# Patient Record
Sex: MALE | Race: WHITE | NOT HISPANIC OR LATINO | Employment: PART TIME | URBAN - METROPOLITAN AREA
[De-identification: names, ages, dates, MRNs, and addresses within clinical notes are randomized per-mention and may not be internally consistent; named-entity substitution may affect disease eponyms.]

---

## 2023-02-24 LAB — SARS-COV-2 RESULT: NOT DETECTED

## 2023-04-21 ENCOUNTER — OFFICE VISIT (OUTPATIENT)
Dept: PRIMARY CARE | Facility: CLINIC | Age: 68
End: 2023-04-21
Payer: MEDICARE

## 2023-04-21 ENCOUNTER — LAB (OUTPATIENT)
Dept: LAB | Facility: LAB | Age: 68
End: 2023-04-21
Payer: MEDICARE

## 2023-04-21 VITALS
DIASTOLIC BLOOD PRESSURE: 80 MMHG | RESPIRATION RATE: 19 BRPM | WEIGHT: 242 LBS | BODY MASS INDEX: 34.65 KG/M2 | OXYGEN SATURATION: 93 % | SYSTOLIC BLOOD PRESSURE: 128 MMHG | HEART RATE: 80 BPM | TEMPERATURE: 97.5 F | HEIGHT: 70 IN

## 2023-04-21 DIAGNOSIS — R10.9 ABDOMINAL PAIN, UNSPECIFIED ABDOMINAL LOCATION: ICD-10-CM

## 2023-04-21 DIAGNOSIS — S30.1XXA ABDOMINAL WALL SEROMA, INITIAL ENCOUNTER: Primary | ICD-10-CM

## 2023-04-21 DIAGNOSIS — R14.0 ABDOMINAL BLOATING: ICD-10-CM

## 2023-04-21 DIAGNOSIS — R10.9 ABDOMINAL PAIN, UNSPECIFIED ABDOMINAL LOCATION: Primary | ICD-10-CM

## 2023-04-21 PROBLEM — C34.90 LUNG CANCER (MULTI): Status: ACTIVE | Noted: 2023-04-21

## 2023-04-21 PROBLEM — R05.9 COUGH: Status: ACTIVE | Noted: 2023-04-21

## 2023-04-21 LAB
ALANINE AMINOTRANSFERASE (SGPT) (U/L) IN SER/PLAS: 30 U/L (ref 10–52)
ALBUMIN (G/DL) IN SER/PLAS: 4.4 G/DL (ref 3.4–5)
ALKALINE PHOSPHATASE (U/L) IN SER/PLAS: 62 U/L (ref 33–136)
AMYLASE (U/L) IN SER/PLAS: 39 U/L (ref 29–103)
ANION GAP IN SER/PLAS: 11 MMOL/L (ref 10–20)
ASPARTATE AMINOTRANSFERASE (SGOT) (U/L) IN SER/PLAS: 20 U/L (ref 9–39)
BASOPHILS (10*3/UL) IN BLOOD BY AUTOMATED COUNT: 0.03 X10E9/L (ref 0–0.1)
BASOPHILS/100 LEUKOCYTES IN BLOOD BY AUTOMATED COUNT: 0.2 % (ref 0–2)
BILIRUBIN TOTAL (MG/DL) IN SER/PLAS: 0.4 MG/DL (ref 0–1.2)
CALCIUM (MG/DL) IN SER/PLAS: 8.9 MG/DL (ref 8.6–10.3)
CARBON DIOXIDE, TOTAL (MMOL/L) IN SER/PLAS: 29 MMOL/L (ref 21–32)
CHLORIDE (MMOL/L) IN SER/PLAS: 105 MMOL/L (ref 98–107)
CREATININE (MG/DL) IN SER/PLAS: 1.14 MG/DL (ref 0.5–1.3)
EOSINOPHILS (10*3/UL) IN BLOOD BY AUTOMATED COUNT: 0 X10E9/L (ref 0–0.7)
EOSINOPHILS/100 LEUKOCYTES IN BLOOD BY AUTOMATED COUNT: 0 % (ref 0–6)
ERYTHROCYTE DISTRIBUTION WIDTH (RATIO) BY AUTOMATED COUNT: 14.4 % (ref 11.5–14.5)
ERYTHROCYTE MEAN CORPUSCULAR HEMOGLOBIN CONCENTRATION (G/DL) BY AUTOMATED: 31.5 G/DL (ref 32–36)
ERYTHROCYTE MEAN CORPUSCULAR VOLUME (FL) BY AUTOMATED COUNT: 96 FL (ref 80–100)
ERYTHROCYTES (10*6/UL) IN BLOOD BY AUTOMATED COUNT: 4.51 X10E12/L (ref 4.5–5.9)
GFR MALE: 70 ML/MIN/1.73M2
GLUCOSE (MG/DL) IN SER/PLAS: 99 MG/DL (ref 74–99)
HEMATOCRIT (%) IN BLOOD BY AUTOMATED COUNT: 43.2 % (ref 41–52)
HEMOGLOBIN (G/DL) IN BLOOD: 13.6 G/DL (ref 13.5–17.5)
IMMATURE GRANULOCYTES/100 LEUKOCYTES IN BLOOD BY AUTOMATED COUNT: 1 % (ref 0–0.9)
LEUKOCYTES (10*3/UL) IN BLOOD BY AUTOMATED COUNT: 12.2 X10E9/L (ref 4.4–11.3)
LIPASE (U/L) IN SER/PLAS: 7 U/L (ref 9–82)
LYMPHOCYTES (10*3/UL) IN BLOOD BY AUTOMATED COUNT: 1.03 X10E9/L (ref 1.2–4.8)
LYMPHOCYTES/100 LEUKOCYTES IN BLOOD BY AUTOMATED COUNT: 8.4 % (ref 13–44)
MONOCYTES (10*3/UL) IN BLOOD BY AUTOMATED COUNT: 1.03 X10E9/L (ref 0.1–1)
MONOCYTES/100 LEUKOCYTES IN BLOOD BY AUTOMATED COUNT: 8.4 % (ref 2–10)
NEUTROPHILS (10*3/UL) IN BLOOD BY AUTOMATED COUNT: 10 X10E9/L (ref 1.2–7.7)
NEUTROPHILS/100 LEUKOCYTES IN BLOOD BY AUTOMATED COUNT: 82 % (ref 40–80)
NRBC (PER 100 WBCS) BY AUTOMATED COUNT: 0 /100 WBC (ref 0–0)
PLATELETS (10*3/UL) IN BLOOD AUTOMATED COUNT: 207 X10E9/L (ref 150–450)
POTASSIUM (MMOL/L) IN SER/PLAS: 4.2 MMOL/L (ref 3.5–5.3)
PROTEIN TOTAL: 7 G/DL (ref 6.4–8.2)
SEDIMENTATION RATE, ERYTHROCYTE: 9 MM/H (ref 0–20)
SODIUM (MMOL/L) IN SER/PLAS: 141 MMOL/L (ref 136–145)
UREA NITROGEN (MG/DL) IN SER/PLAS: 27 MG/DL (ref 6–23)

## 2023-04-21 PROCEDURE — 99213 OFFICE O/P EST LOW 20 MIN: CPT | Performed by: FAMILY MEDICINE

## 2023-04-21 PROCEDURE — 83690 ASSAY OF LIPASE: CPT

## 2023-04-21 PROCEDURE — 36415 COLL VENOUS BLD VENIPUNCTURE: CPT

## 2023-04-21 PROCEDURE — 82150 ASSAY OF AMYLASE: CPT

## 2023-04-21 PROCEDURE — 80053 COMPREHEN METABOLIC PANEL: CPT

## 2023-04-21 PROCEDURE — 85652 RBC SED RATE AUTOMATED: CPT

## 2023-04-21 PROCEDURE — 85025 COMPLETE CBC W/AUTO DIFF WBC: CPT

## 2023-04-21 RX ORDER — IPRATROPIUM BROMIDE 0.5 MG/2.5ML
SOLUTION RESPIRATORY (INHALATION)
COMMUNITY
End: 2024-05-10 | Stop reason: ENTERED-IN-ERROR

## 2023-04-21 RX ORDER — IBUPROFEN 600 MG/1
TABLET ORAL EVERY 6 HOURS PRN
COMMUNITY
End: 2024-05-10 | Stop reason: ENTERED-IN-ERROR

## 2023-04-21 RX ORDER — ALBUTEROL SULFATE 90 UG/1
2 AEROSOL, METERED RESPIRATORY (INHALATION) EVERY 4 HOURS PRN
COMMUNITY
Start: 2022-11-28

## 2023-04-21 RX ORDER — PREDNISONE 20 MG/1
TABLET ORAL
COMMUNITY
Start: 2022-11-28 | End: 2024-05-10 | Stop reason: ENTERED-IN-ERROR

## 2023-04-21 RX ORDER — IPRATROPIUM BROMIDE AND ALBUTEROL SULFATE 2.5; .5 MG/3ML; MG/3ML
3 SOLUTION RESPIRATORY (INHALATION) EVERY 4 HOURS PRN
COMMUNITY
Start: 2023-04-12

## 2023-04-21 RX ORDER — FLUTICASONE FUROATE, UMECLIDINIUM BROMIDE AND VILANTEROL TRIFENATATE 200; 62.5; 25 UG/1; UG/1; UG/1
1 POWDER RESPIRATORY (INHALATION) DAILY
COMMUNITY
Start: 2023-02-21

## 2023-04-21 RX ORDER — OXYCODONE HYDROCHLORIDE 5 MG/1
TABLET ORAL
COMMUNITY
Start: 2023-02-28 | End: 2024-05-10 | Stop reason: ENTERED-IN-ERROR

## 2023-04-21 RX ORDER — UMECLIDINIUM BROMIDE AND VILANTEROL TRIFENATATE 62.5; 25 UG/1; UG/1
POWDER RESPIRATORY (INHALATION)
COMMUNITY
End: 2024-05-10 | Stop reason: ENTERED-IN-ERROR

## 2023-04-21 RX ORDER — ALBUTEROL SULFATE 0.83 MG/ML
2.5 SOLUTION RESPIRATORY (INHALATION)
COMMUNITY
Start: 2023-04-12 | End: 2024-05-10 | Stop reason: ENTERED-IN-ERROR

## 2023-04-21 RX ORDER — CHLORHEXIDINE GLUCONATE 4 G/100ML
SOLUTION TOPICAL
COMMUNITY
Start: 2023-02-20 | End: 2024-05-10 | Stop reason: ENTERED-IN-ERROR

## 2023-04-21 RX ORDER — CHLORHEXIDINE GLUCONATE ORAL RINSE 1.2 MG/ML
SOLUTION DENTAL
COMMUNITY
Start: 2023-02-20 | End: 2024-05-10 | Stop reason: ENTERED-IN-ERROR

## 2023-04-21 RX ORDER — FUROSEMIDE 20 MG/1
1 TABLET ORAL 2 TIMES DAILY
COMMUNITY
Start: 2022-05-18 | End: 2024-05-10 | Stop reason: ENTERED-IN-ERROR

## 2023-04-21 RX ORDER — AMOXICILLIN AND CLAVULANATE POTASSIUM 875; 125 MG/1; MG/1
875 TABLET, FILM COATED ORAL 2 TIMES DAILY
Qty: 28 TABLET | Refills: 0 | Status: SHIPPED | OUTPATIENT
Start: 2023-04-21 | End: 2023-05-05

## 2023-04-21 ASSESSMENT — ENCOUNTER SYMPTOMS
BLOOD IN STOOL: 0
FEVER: 0
SHORTNESS OF BREATH: 0
NAUSEA: 0
CONSTIPATION: 0
VOMITING: 0
ABDOMINAL PAIN: 1

## 2023-04-21 NOTE — PROGRESS NOTES
Subjective   Patient ID: Norma Erwin is a 67 y.o. male who presents for Follow-up (2 month follow up from lung surgery. Pt states he has been doing ok. He did see pulmonologist and had some meds changed due to SOB. SOB has improved. He also states discomfort and bloating in the right side and tenderness to the touch. ).  HPI    Review of Systems   Constitutional:  Negative for fever.   Respiratory:  Negative for shortness of breath.    Cardiovascular:  Negative for chest pain.   Gastrointestinal:  Positive for abdominal pain. Negative for blood in stool, constipation, nausea and vomiting.       Objective   Physical Exam  Constitutional:       Appearance: Normal appearance.   HENT:      Head: Normocephalic.      Right Ear: Tympanic membrane, ear canal and external ear normal.      Left Ear: Tympanic membrane, ear canal and external ear normal.      Nose: Nose normal.      Mouth/Throat:      Mouth: Mucous membranes are moist.      Pharynx: Oropharynx is clear.   Eyes:      Conjunctiva/sclera: Conjunctivae normal.   Cardiovascular:      Rate and Rhythm: Normal rate and regular rhythm.   Pulmonary:      Effort: Pulmonary effort is normal.      Breath sounds: Normal breath sounds.   Abdominal:      General: There is distension (right flank).      Tenderness: There is abdominal tenderness. There is rebound.   Musculoskeletal:         General: Normal range of motion.      Cervical back: Neck supple.   Skin:     General: Skin is warm and dry.   Neurological:      General: No focal deficit present.      Mental Status: He is alert and oriented to person, place, and time.   Psychiatric:         Mood and Affect: Mood normal.         Assessment/Plan

## 2023-06-14 ENCOUNTER — OFFICE VISIT (OUTPATIENT)
Dept: PRIMARY CARE | Facility: CLINIC | Age: 68
End: 2023-06-14
Payer: MEDICARE

## 2023-06-14 VITALS
RESPIRATION RATE: 18 BRPM | HEART RATE: 68 BPM | BODY MASS INDEX: 33.32 KG/M2 | SYSTOLIC BLOOD PRESSURE: 130 MMHG | WEIGHT: 238 LBS | OXYGEN SATURATION: 96 % | DIASTOLIC BLOOD PRESSURE: 66 MMHG | TEMPERATURE: 97.7 F | HEIGHT: 71 IN

## 2023-06-14 DIAGNOSIS — R10.11 RIGHT UPPER QUADRANT ABDOMINAL PAIN: Primary | ICD-10-CM

## 2023-06-14 PROCEDURE — 99213 OFFICE O/P EST LOW 20 MIN: CPT | Performed by: FAMILY MEDICINE

## 2023-06-14 RX ORDER — HYOSCYAMINE SULFATE 0.12 MG/1
0.12 TABLET, ORALLY DISINTEGRATING ORAL 2 TIMES DAILY
Qty: 14 TABLET | Refills: 0 | Status: SHIPPED | OUTPATIENT
Start: 2023-06-14 | End: 2024-03-01 | Stop reason: WASHOUT

## 2023-06-14 RX ORDER — PREDNISONE 20 MG/1
40 TABLET ORAL DAILY
Qty: 10 TABLET | Refills: 0 | Status: SHIPPED | OUTPATIENT
Start: 2023-06-14 | End: 2023-06-14 | Stop reason: SDUPTHER

## 2023-06-14 RX ORDER — HYOSCYAMINE SULFATE 0.12 MG/1
0.12 TABLET, ORALLY DISINTEGRATING ORAL 2 TIMES DAILY
Qty: 14 TABLET | Refills: 0 | Status: SHIPPED | OUTPATIENT
Start: 2023-06-14 | End: 2023-06-14 | Stop reason: SDUPTHER

## 2023-06-14 RX ORDER — HYOSCYAMINE SULFATE 0.12 MG/1
125 TABLET SUBLINGUAL 2 TIMES DAILY
Qty: 14 TABLET | Refills: 0 | Status: SHIPPED | OUTPATIENT
Start: 2023-06-14 | End: 2023-06-14 | Stop reason: SDUPTHER

## 2023-06-14 RX ORDER — PREDNISONE 20 MG/1
40 TABLET ORAL DAILY
Qty: 10 TABLET | Refills: 0 | Status: SHIPPED | OUTPATIENT
Start: 2023-06-14 | End: 2023-06-19

## 2023-06-14 ASSESSMENT — ENCOUNTER SYMPTOMS
CONSTIPATION: 0
FEVER: 0
DIARRHEA: 0
VOMITING: 0
SHORTNESS OF BREATH: 0
ABDOMINAL DISTENTION: 1
FLANK PAIN: 1
ABDOMINAL PAIN: 1
NAUSEA: 0

## 2023-06-14 NOTE — PROGRESS NOTES
Subjective   Patient ID: Norma Erwin is a 67 y.o. male who presents for Follow-up (Pt is her for follow up from surgery for a nodule that was removed 2-23 from the right lung. He states he had a drain on the right side that was removed and since he has been having a pressure feeling and pain and discomfort. ).  HPI    Review of Systems   Constitutional:  Negative for fever.   Respiratory:  Negative for shortness of breath.    Cardiovascular:  Negative for chest pain.   Gastrointestinal:  Positive for abdominal distention and abdominal pain. Negative for constipation, diarrhea, nausea and vomiting.   Genitourinary:  Positive for flank pain.       Objective   Physical Exam  Constitutional:       Appearance: Normal appearance.   HENT:      Head: Normocephalic.      Right Ear: Tympanic membrane, ear canal and external ear normal.      Left Ear: Tympanic membrane, ear canal and external ear normal.      Nose: Nose normal.      Mouth/Throat:      Mouth: Mucous membranes are moist.      Pharynx: Oropharynx is clear.   Eyes:      Conjunctiva/sclera: Conjunctivae normal.   Cardiovascular:      Rate and Rhythm: Normal rate and regular rhythm.   Pulmonary:      Effort: Pulmonary effort is normal.      Breath sounds: Normal breath sounds.   Abdominal:      General: Abdomen is flat.      Palpations: Abdomen is soft.      Tenderness: There is abdominal tenderness in the right upper quadrant.       Musculoskeletal:         General: Normal range of motion.      Cervical back: Neck supple.   Skin:     General: Skin is warm and dry.   Neurological:      General: No focal deficit present.      Mental Status: He is alert and oriented to person, place, and time.   Psychiatric:         Mood and Affect: Mood normal.         Assessment/Plan   Problem List Items Addressed This Visit    None  Visit Diagnoses       Right upper quadrant abdominal pain    -  Primary    Relevant Medications    hyoscyamine (Levsin/SL) 0.125 mg SL tablet     predniSONE (Deltasone) 20 mg tablet

## 2023-06-28 ENCOUNTER — TELEPHONE (OUTPATIENT)
Dept: PRIMARY CARE | Facility: CLINIC | Age: 68
End: 2023-06-28
Payer: MEDICARE

## 2023-06-28 NOTE — TELEPHONE ENCOUNTER
Snow Graves a Pulmonary NP called and would like to know if you would be able to call her to review Lad's case?     [phone]

## 2023-08-25 LAB
ALANINE AMINOTRANSFERASE (SGPT) (U/L) IN SER/PLAS: 26 U/L (ref 10–52)
ALBUMIN (G/DL) IN SER/PLAS: 4.3 G/DL (ref 3.4–5)
ALKALINE PHOSPHATASE (U/L) IN SER/PLAS: 66 U/L (ref 33–136)
ANION GAP IN SER/PLAS: 12 MMOL/L (ref 10–20)
ASPARTATE AMINOTRANSFERASE (SGOT) (U/L) IN SER/PLAS: 18 U/L (ref 9–39)
BILIRUBIN TOTAL (MG/DL) IN SER/PLAS: 0.3 MG/DL (ref 0–1.2)
CALCIUM (MG/DL) IN SER/PLAS: 9.5 MG/DL (ref 8.6–10.6)
CARBON DIOXIDE, TOTAL (MMOL/L) IN SER/PLAS: 30 MMOL/L (ref 21–32)
CHLORIDE (MMOL/L) IN SER/PLAS: 105 MMOL/L (ref 98–107)
CREATININE (MG/DL) IN SER/PLAS: 1.15 MG/DL (ref 0.5–1.3)
GFR MALE: 69 ML/MIN/1.73M2
GLUCOSE (MG/DL) IN SER/PLAS: 73 MG/DL (ref 74–99)
POTASSIUM (MMOL/L) IN SER/PLAS: 4.3 MMOL/L (ref 3.5–5.3)
PROTEIN TOTAL: 6.8 G/DL (ref 6.4–8.2)
SODIUM (MMOL/L) IN SER/PLAS: 143 MMOL/L (ref 136–145)
UREA NITROGEN (MG/DL) IN SER/PLAS: 15 MG/DL (ref 6–23)

## 2024-02-06 DIAGNOSIS — C34.90 MALIGNANT NEOPLASM OF LUNG, UNSPECIFIED LATERALITY, UNSPECIFIED PART OF LUNG (MULTI): Primary | ICD-10-CM

## 2024-02-23 ENCOUNTER — LAB (OUTPATIENT)
Dept: LAB | Facility: LAB | Age: 69
End: 2024-02-23
Payer: MEDICARE

## 2024-02-23 DIAGNOSIS — C34.90 MALIGNANT NEOPLASM OF LUNG, UNSPECIFIED LATERALITY, UNSPECIFIED PART OF LUNG (MULTI): ICD-10-CM

## 2024-02-23 LAB
BUN SERPL-MCNC: 19 MG/DL (ref 6–23)
CREAT SERPL-MCNC: 1.17 MG/DL (ref 0.5–1.3)
EGFRCR SERPLBLD CKD-EPI 2021: 67 ML/MIN/1.73M*2

## 2024-02-23 PROCEDURE — 82565 ASSAY OF CREATININE: CPT

## 2024-02-23 PROCEDURE — 84520 ASSAY OF UREA NITROGEN: CPT

## 2024-02-27 ENCOUNTER — HOSPITAL ENCOUNTER (OUTPATIENT)
Dept: RADIOLOGY | Facility: CLINIC | Age: 69
Discharge: HOME | End: 2024-02-27
Payer: MEDICARE

## 2024-02-27 DIAGNOSIS — C34.91 MALIGNANT NEOPLASM OF UNSPECIFIED PART OF RIGHT BRONCHUS OR LUNG (MULTI): ICD-10-CM

## 2024-02-27 PROCEDURE — 71260 CT THORAX DX C+: CPT

## 2024-02-27 PROCEDURE — 2550000001 HC RX 255 CONTRASTS: Performed by: INTERNAL MEDICINE

## 2024-02-27 RX ADMIN — IOHEXOL 69 ML: 350 INJECTION, SOLUTION INTRAVENOUS at 10:34

## 2024-02-28 NOTE — PROGRESS NOTES
Subjective   Matt Erwin  is a 68 y.o. male who presents for evaluation of right upper lobe lung cancer status postsurgical resection 2/27/2023.    This patient presented with a an abnormal x-ray in November 2022. He was referred for a CT scan which confirmed a right upper lobe lung lesion. He was referred for a CT-guided biopsy which was performed in January 2023 and showed poorly differentiated carcinoma. He was referred for a PET/CT which showed a right upper lobe lesion with no evidence of metastatic disease. I recommended surgical resection. The patient was taken to the operating room on 2/27/2023 and underwent a stable right upper lobe segmental resection. He did well and was discharged home.     Currently the patient is presenting for routine follow-up, in their usual state of health, and reporting symptoms of chronic dyspnea on exertion. He deny the following symptoms: chest pain, shortness of breath at rest, cough, hemoptysis, fevers, chills, and weight loss.  Gaining weight    There have been no significant changes to their documented medical, surgical and family history.     He  reports that he has quit smoking. His smoking use included cigarettes. He has never used smokeless tobacco.    Objective   Physical Exam  Phone visit  Diagnostic Studies  I have reviewed a CT which shows multiple new lung nodules    Assessment/Plan   Overall, I believe that the patient has concerning findings and requires additional workup.     Etiology of these lung nodules are unclear.  The bilateral and lower lobe distribution to me suggest an infectious or inflammatory process, but I cannot exclude the possibility of metastasis or recurrence.  In this setting, I believe a PET/CT is  reasonable.  I described this to the patient in detail.  He agreed.    The patient will also is seeing Dr. Moran, whose opinion on this matter I also value    I recommend PET CT    I discussed this in detail with the patient, including a discussion  of alternatives. They were comfortable with this approach.     Time 5min    Michael Auguste MD  [phone]

## 2024-02-29 ENCOUNTER — TELEMEDICINE (OUTPATIENT)
Dept: SURGERY | Facility: HOSPITAL | Age: 69
End: 2024-02-29
Payer: MEDICARE

## 2024-02-29 DIAGNOSIS — C34.11 MALIGNANT NEOPLASM OF UPPER LOBE OF RIGHT LUNG (MULTI): Primary | ICD-10-CM

## 2024-02-29 PROCEDURE — 99441 PR PHYS/QHP TELEPHONE EVALUATION 5-10 MIN: CPT | Performed by: THORACIC SURGERY (CARDIOTHORACIC VASCULAR SURGERY)

## 2024-02-29 PROCEDURE — 1125F AMNT PAIN NOTED PAIN PRSNT: CPT | Performed by: THORACIC SURGERY (CARDIOTHORACIC VASCULAR SURGERY)

## 2024-02-29 PROCEDURE — 1036F TOBACCO NON-USER: CPT | Performed by: THORACIC SURGERY (CARDIOTHORACIC VASCULAR SURGERY)

## 2024-03-01 ENCOUNTER — OFFICE VISIT (OUTPATIENT)
Dept: HEMATOLOGY/ONCOLOGY | Facility: CLINIC | Age: 69
End: 2024-03-01
Payer: MEDICARE

## 2024-03-01 VITALS
BODY MASS INDEX: 32.87 KG/M2 | RESPIRATION RATE: 16 BRPM | SYSTOLIC BLOOD PRESSURE: 149 MMHG | OXYGEN SATURATION: 94 % | DIASTOLIC BLOOD PRESSURE: 75 MMHG | TEMPERATURE: 97.9 F | WEIGHT: 235.67 LBS | HEART RATE: 86 BPM

## 2024-03-01 DIAGNOSIS — C34.01 MALIGNANT NEOPLASM OF HILUS OF RIGHT LUNG (MULTI): Primary | ICD-10-CM

## 2024-03-01 DIAGNOSIS — C34.31 MALIGNANT NEOPLASM OF LOWER LOBE OF RIGHT LUNG (MULTI): ICD-10-CM

## 2024-03-01 PROCEDURE — 1126F AMNT PAIN NOTED NONE PRSNT: CPT | Performed by: INTERNAL MEDICINE

## 2024-03-01 PROCEDURE — 1159F MED LIST DOCD IN RCRD: CPT | Performed by: INTERNAL MEDICINE

## 2024-03-01 PROCEDURE — 1036F TOBACCO NON-USER: CPT | Performed by: INTERNAL MEDICINE

## 2024-03-01 PROCEDURE — 99213 OFFICE O/P EST LOW 20 MIN: CPT | Performed by: INTERNAL MEDICINE

## 2024-03-01 RX ORDER — IBUPROFEN 200 MG
2-4 TABLET ORAL EVERY 6 HOURS PRN
COMMUNITY

## 2024-03-01 ASSESSMENT — ENCOUNTER SYMPTOMS
VOMITING: 0
CONSTIPATION: 0
HEADACHES: 0
DIZZINESS: 0
EYE PROBLEMS: 0
COUGH: 0
DIARRHEA: 0
SHORTNESS OF BREATH: 0
ADENOPATHY: 0
ARTHRALGIAS: 0
DIFFICULTY URINATING: 0
FATIGUE: 0
ABDOMINAL PAIN: 0
NAUSEA: 0
LEG SWELLING: 0
APPETITE CHANGE: 0
BACK PAIN: 0
UNEXPECTED WEIGHT CHANGE: 0

## 2024-03-01 ASSESSMENT — PAIN SCALES - GENERAL: PAINLEVEL: 0-NO PAIN

## 2024-03-01 NOTE — PROGRESS NOTES
"[facility] - Medical Oncology Follow-Up Visit    Patient ID: Norma Erwin \"Matt\" is a 68 y.o. male with prior poorly differentiated SCC of the lung     Current therapy:Surveillance     Chief Concern: Here for follow-up and scan review    HPI Patient here today for follow-up. Overall he has been feeling pretty well - over the winter had a couple episodes of worsening cough/shortness of breath and was treated by Dr. Escobedo with steroids/abx. He reports having CXR and CT scan (though I'm unable to find). Some fatigue but good days and bad. Weight overall stable, has fluctuated both directions.    Diagnosis: poorly differentiated SCC of the lung  Stage:  Cancer Staging   Lung cancer (CMS/HCC)  Staging form: Lung, AJCC 8th Edition  - Clinical stage from 2/27/2023: Stage IA3 (cT1c, cN0, cM0) - Signed by Nia Moran MD on 3/1/2024     Current sites of disease: ?  Molecular and ancillary testing:   +PIK3CA and TP53 mutations  PD-L1 90%    Oncologic History:  11/2022 - CXR with RUL lesion, confirmed on CT 12/2022 1/2023 - CT-guided biopsy of RUL lesion with poorly differentiated carcinoma; PET with RUL lesion, no evidence of metastatic disease  2/27/23 - RUL segmentectomy w/ christofer Matute with pT1cN0 squamous cell carcinoma, poorly differentiated, negative margins  2/27/24 - CT chest with development of multiple bilateral lower lobe nodules    Meds (Current):  Current Outpatient Medications   Medication Instructions    albuterol 2.5 mg    Anoro Ellipta 62.5-25 mcg/actuation blister with device inhalation    chlorhexidine (Peridex) 0.12 % solution RINSE MOUTH WITH 15 ML (1 CAPFUL) FOR 30 SECONDS IN THE MORNING AND IN THE EVENING AFTER TOOHBRUSHING, EXPECTORATE AFTER RINSING, DO NOT SWALLOW    Edna-Hex 4 % external liquid use as directed    fluticasone-umeclidin-vilanter (TRELEGY-ELLIPTA) 100-62.5-25 mcg blister with device inhalation    furosemide (Lasix) 20 mg tablet 1 tablet, oral, " 2 times daily    hyoscyamine (ANASPAZ) 0.125 mg, sublingual, 2 times daily    ibuprofen 600 mg tablet oral, Every 6 hours PRN    ibuprofen 800 mg, oral, Every 6 hours PRN    ipratropium (Atrovent) 0.02 % nebulizer solution INHALE 1 VIAL IN NEBULIZER EVERY 4 HOURS AS NEEDED    ipratropium-albuteroL (Duo-Neb) 0.5-2.5 mg/3 mL nebulizer solution 3 mL, inhalation, Every 4 hours PRN    multivitamin with minerals iron-free (Centrum Silver) 1 tablet, oral, Daily    oxyCODONE (Roxicodone) 5 mg immediate release tablet TAKE 1 TABLET BY MOUTH EVERY 6 HOURS AS NEEDED FOR PAIN AS DIRECTED BY PHYSICIAN -- MODERATE (4-6)    predniSONE (Deltasone) 20 mg tablet oral    Trelegy Ellipta 200-62.5-25 mcg blister with device INHALE 1 DOSE INTO LUNGS AS INSTRUCTED BY PHYSICIAN ONCE DAILY    Ventolin HFA 90 mcg/actuation inhaler 2 puffs, inhalation, Every 4 hours PRN      Review of Systems   Constitutional:  Negative for appetite change, fatigue and unexpected weight change.   HENT:   Negative for hearing loss.    Eyes:  Negative for eye problems.   Respiratory:  Negative for cough and shortness of breath.    Cardiovascular:  Negative for chest pain and leg swelling.   Gastrointestinal:  Negative for abdominal pain, constipation, diarrhea, nausea and vomiting.   Genitourinary:  Negative for difficulty urinating.    Musculoskeletal:  Negative for arthralgias and back pain.   Skin:  Negative for rash.   Neurological:  Negative for dizziness and headaches.   Hematological:  Negative for adenopathy.        Objective   BSA: 2.32 meters squared  /75 (BP Location: Left arm, Patient Position: Sitting)   Pulse 86   Temp 36.6 °C (97.9 °F) (Temporal)   Resp 16   Wt 107 kg (235 lb 10.8 oz)   SpO2 94%   BMI 32.87 kg/m²   Performance Status:  Symptomatic; fully ambulatory     Physical Exam  Vitals and nursing note reviewed.   Constitutional:       General: He is not in acute distress.  HENT:      Head: Normocephalic and atraumatic.   Eyes:       Pupils: Pupils are equal, round, and reactive to light.   Cardiovascular:      Rate and Rhythm: Normal rate and regular rhythm.      Heart sounds: Normal heart sounds.   Pulmonary:      Effort: Pulmonary effort is normal.      Breath sounds: Normal breath sounds.   Abdominal:      General: There is no distension.   Musculoskeletal:         General: No swelling.   Skin:     General: Skin is warm and dry.      Findings: No rash.   Neurological:      General: No focal deficit present.      Mental Status: He is alert and oriented to person, place, and time.   Psychiatric:         Mood and Affect: Mood normal.         Behavior: Behavior normal.        Results:  Labs:  Lab Results   Component Value Date    WBC 12.2 (H) 04/21/2023    HGB 13.6 04/21/2023    HCT 43.2 04/21/2023    MCV 96 04/21/2023     04/21/2023      Lab Results   Component Value Date    NEUTROABS 10.00 (H) 04/21/2023      Lab Results   Component Value Date    GLUCOSE 73 (L) 08/25/2023    CALCIUM 9.5 08/25/2023     08/25/2023    K 4.3 08/25/2023    CO2 30 08/25/2023     08/25/2023    BUN 19 02/23/2024    CREATININE 1.17 02/23/2024    MG CANCELED 03/01/2023     Lab Results   Component Value Date    ALT 26 08/25/2023    AST 18 08/25/2023    ALKPHOS 66 08/25/2023    BILITOT 0.3 08/25/2023      Lab Results   Component Value Date    TSH 3.05 11/28/2022       Imaging:  I have personally reviewed the below imaging and concur with the reported findings unless otherwise stated:    CT chest w IV contrast    Result Date: 2/28/2024  Impression: 1.  Interval development of bilateral lower lobe multiple solid nodules measuring up to 2.3 cm. Metastatic disease is definitely a concern and PET-CT or follow-up study in 3-6 months are considerations in differentiating between inflammatory and metastatic lesions. 2. Small right pleural effusion is slightly smaller than the prior exam   MACRO: None   Signed by: Susanna Berger 2/28/2024 10:11 AM  "Dictation workstation:   [id number]    Pathology:    No new pathology      Assessment/Plan      Norma Erwin \"Matt\" is a 68 y.o. 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     Currently under surveillance with repeat CT chest showing multiple new bilateral lower lobe solid nodules. Reviewed imaging with patient - discussed that time course and pattern, as well asrecent history of cough/infection treated with abx indicates these may be infectious/inflammatory nodules, however I agree with plan for PET to further assess. If not improved and significantly FDG-avid on PET will plan for biopsy.    Plan:    - PET/CT scheduled  - Follow-up with me after PET to review results and discuss next steps; plan phone visit per his request and can make in person as needed    Nia Moran MD  [facility]    "

## 2024-03-13 ENCOUNTER — HOSPITAL ENCOUNTER (OUTPATIENT)
Dept: RADIOLOGY | Facility: CLINIC | Age: 69
Discharge: HOME | End: 2024-03-13
Payer: MEDICARE

## 2024-03-13 DIAGNOSIS — C34.11 MALIGNANT NEOPLASM OF UPPER LOBE OF RIGHT LUNG (MULTI): ICD-10-CM

## 2024-03-13 PROCEDURE — A9552 F18 FDG: HCPCS | Mod: MUE | Performed by: THORACIC SURGERY (CARDIOTHORACIC VASCULAR SURGERY)

## 2024-03-13 PROCEDURE — 3430000001 HC RX 343 DIAGNOSTIC RADIOPHARMACEUTICALS: Mod: MUE | Performed by: THORACIC SURGERY (CARDIOTHORACIC VASCULAR SURGERY)

## 2024-03-13 PROCEDURE — 78815 PET IMAGE W/CT SKULL-THIGH: CPT | Mod: PS

## 2024-03-13 PROCEDURE — 78815 PET IMAGE W/CT SKULL-THIGH: CPT | Mod: PET TUMOR SUBSQ TX STRATEGY | Performed by: NUCLEAR MEDICINE

## 2024-03-13 RX ORDER — FLUDEOXYGLUCOSE F 18 200 MCI/ML
13.47 INJECTION, SOLUTION INTRAVENOUS
Status: COMPLETED | OUTPATIENT
Start: 2024-03-13 | End: 2024-03-13

## 2024-03-13 RX ADMIN — FLUDEOXYGLUCOSE F 18 13.47 MILLICURIE: 200 INJECTION, SOLUTION INTRAVENOUS at 08:49

## 2024-03-14 ENCOUNTER — TELEMEDICINE (OUTPATIENT)
Dept: HEMATOLOGY/ONCOLOGY | Facility: CLINIC | Age: 69
End: 2024-03-14
Payer: MEDICARE

## 2024-03-14 DIAGNOSIS — C34.31 MALIGNANT NEOPLASM OF LOWER LOBE OF RIGHT LUNG (MULTI): ICD-10-CM

## 2024-03-14 PROCEDURE — 1036F TOBACCO NON-USER: CPT | Performed by: INTERNAL MEDICINE

## 2024-03-14 PROCEDURE — 1159F MED LIST DOCD IN RCRD: CPT | Performed by: INTERNAL MEDICINE

## 2024-03-14 PROCEDURE — 99441 PR PHYS/QHP TELEPHONE EVALUATION 5-10 MIN: CPT | Performed by: INTERNAL MEDICINE

## 2024-03-14 NOTE — PROGRESS NOTES
Telephone visit with patient today to review Pet findings    Discussed no FDG avidity of multifocal nodules seen on CT, slight improvement. He reports he saw Dr. Escobedo yesterday and has plans to give sputum sample and possibly treat with abx pending results. He continues to feel well. I recommend repeat CT chest in about 2 months to ensure these are resolving and advised him to let us know if anything changes prior - including shortness of breath, worsening cough, weight loss etc.    Also asked about GERD/acid reflux - he reports may have mild symptoms. Reports discussed with Dr. Escobedo and has some plans with respect to this but will be discussed after sputum sample. He denies food getting stuck or dysphagia.    Plan follow-up in 2m after repeat CT    Nia Moran MD  [facility]

## 2024-05-03 ENCOUNTER — LAB (OUTPATIENT)
Dept: LAB | Facility: LAB | Age: 69
End: 2024-05-03
Payer: MEDICARE

## 2024-05-03 DIAGNOSIS — J44.9 CHRONIC OBSTRUCTIVE PULMONARY DISEASE, UNSPECIFIED (MULTI): Primary | ICD-10-CM

## 2024-05-03 LAB
BACTERIA SPEC RESP CULT: ABNORMAL
GRAM STN SPEC: ABNORMAL

## 2024-05-03 PROCEDURE — 87205 SMEAR GRAM STAIN: CPT

## 2024-05-03 PROCEDURE — 87015 SPECIMEN INFECT AGNT CONCNTJ: CPT

## 2024-05-03 PROCEDURE — 87206 SMEAR FLUORESCENT/ACID STAI: CPT

## 2024-05-03 PROCEDURE — 87116 MYCOBACTERIA CULTURE: CPT

## 2024-05-03 PROCEDURE — 87102 FUNGUS ISOLATION CULTURE: CPT

## 2024-05-07 LAB
FUNGUS SPEC CULT: ABNORMAL
FUNGUS SPEC FUNGUS STN: ABNORMAL

## 2024-05-08 ENCOUNTER — LAB (OUTPATIENT)
Dept: LAB | Facility: LAB | Age: 69
End: 2024-05-08
Payer: MEDICARE

## 2024-05-08 DIAGNOSIS — C34.31 MALIGNANT NEOPLASM OF LOWER LOBE OF RIGHT LUNG (MULTI): ICD-10-CM

## 2024-05-08 LAB
CREAT SERPL-MCNC: 1.1 MG/DL (ref 0.5–1.3)
EGFRCR SERPLBLD CKD-EPI 2021: 73 ML/MIN/1.73M*2

## 2024-05-08 PROCEDURE — 36415 COLL VENOUS BLD VENIPUNCTURE: CPT

## 2024-05-08 PROCEDURE — 82565 ASSAY OF CREATININE: CPT

## 2024-05-10 ENCOUNTER — APPOINTMENT (OUTPATIENT)
Dept: RADIOLOGY | Facility: HOSPITAL | Age: 69
DRG: 871 | End: 2024-05-10
Payer: MEDICARE

## 2024-05-10 ENCOUNTER — HOSPITAL ENCOUNTER (INPATIENT)
Facility: HOSPITAL | Age: 69
LOS: 3 days | Discharge: HOME | DRG: 871 | End: 2024-05-13
Attending: INTERNAL MEDICINE | Admitting: HOSPITALIST
Payer: MEDICARE

## 2024-05-10 ENCOUNTER — APPOINTMENT (OUTPATIENT)
Dept: CARDIOLOGY | Facility: HOSPITAL | Age: 69
DRG: 871 | End: 2024-05-10
Payer: MEDICARE

## 2024-05-10 DIAGNOSIS — A41.9 SEPSIS, DUE TO UNSPECIFIED ORGANISM, UNSPECIFIED WHETHER ACUTE ORGAN DYSFUNCTION PRESENT (MULTI): ICD-10-CM

## 2024-05-10 DIAGNOSIS — J18.9 HCAP (HEALTHCARE-ASSOCIATED PNEUMONIA): Primary | ICD-10-CM

## 2024-05-10 DIAGNOSIS — R07.9 CHEST PAIN, UNSPECIFIED TYPE: ICD-10-CM

## 2024-05-10 LAB
ALBUMIN SERPL BCP-MCNC: 4.2 G/DL (ref 3.4–5)
ALP SERPL-CCNC: 81 U/L (ref 33–136)
ALT SERPL W P-5'-P-CCNC: 24 U/L (ref 10–52)
ANION GAP SERPL CALC-SCNC: 15 MMOL/L (ref 10–20)
AST SERPL W P-5'-P-CCNC: 17 U/L (ref 9–39)
BASOPHILS # BLD AUTO: 0.05 X10*3/UL (ref 0–0.1)
BASOPHILS NFR BLD AUTO: 0.2 %
BILIRUB SERPL-MCNC: 0.7 MG/DL (ref 0–1.2)
BNP SERPL-MCNC: 48 PG/ML (ref 0–99)
BUN SERPL-MCNC: 23 MG/DL (ref 6–23)
CALCIUM SERPL-MCNC: 9 MG/DL (ref 8.6–10.3)
CARDIAC TROPONIN I PNL SERPL HS: 5 NG/L (ref 0–20)
CARDIAC TROPONIN I PNL SERPL HS: 8 NG/L (ref 0–20)
CHLORIDE SERPL-SCNC: 99 MMOL/L (ref 98–107)
CO2 SERPL-SCNC: 27 MMOL/L (ref 21–32)
CREAT SERPL-MCNC: 1.25 MG/DL (ref 0.5–1.3)
D DIMER PPP FEU-MCNC: 758 NG/ML FEU
EGFRCR SERPLBLD CKD-EPI 2021: 62 ML/MIN/1.73M*2
EOSINOPHIL # BLD AUTO: 0.02 X10*3/UL (ref 0–0.7)
EOSINOPHIL NFR BLD AUTO: 0.1 %
ERYTHROCYTE [DISTWIDTH] IN BLOOD BY AUTOMATED COUNT: 14 % (ref 11.5–14.5)
GLUCOSE SERPL-MCNC: 106 MG/DL (ref 74–99)
HCT VFR BLD AUTO: 42.1 % (ref 41–52)
HGB BLD-MCNC: 13.7 G/DL (ref 13.5–17.5)
IMM GRANULOCYTES # BLD AUTO: 0.15 X10*3/UL (ref 0–0.7)
IMM GRANULOCYTES NFR BLD AUTO: 0.7 % (ref 0–0.9)
LACTATE SERPL-SCNC: 1.9 MMOL/L (ref 0.4–2)
LYMPHOCYTES # BLD AUTO: 1.68 X10*3/UL (ref 1.2–4.8)
LYMPHOCYTES NFR BLD AUTO: 7.4 %
MAGNESIUM SERPL-MCNC: 2.1 MG/DL (ref 1.6–2.4)
MCH RBC QN AUTO: 30.2 PG (ref 26–34)
MCHC RBC AUTO-ENTMCNC: 32.5 G/DL (ref 32–36)
MCV RBC AUTO: 93 FL (ref 80–100)
MONOCYTES # BLD AUTO: 1.43 X10*3/UL (ref 0.1–1)
MONOCYTES NFR BLD AUTO: 6.3 %
NEUTROPHILS # BLD AUTO: 19.45 X10*3/UL (ref 1.2–7.7)
NEUTROPHILS NFR BLD AUTO: 85.3 %
NRBC BLD-RTO: 0 /100 WBCS (ref 0–0)
PLATELET # BLD AUTO: 218 X10*3/UL (ref 150–450)
POTASSIUM SERPL-SCNC: 4 MMOL/L (ref 3.5–5.3)
PROT SERPL-MCNC: 7.5 G/DL (ref 6.4–8.2)
RBC # BLD AUTO: 4.53 X10*6/UL (ref 4.5–5.9)
SODIUM SERPL-SCNC: 137 MMOL/L (ref 136–145)
WBC # BLD AUTO: 22.8 X10*3/UL (ref 4.4–11.3)

## 2024-05-10 PROCEDURE — 83735 ASSAY OF MAGNESIUM: CPT | Performed by: NURSE PRACTITIONER

## 2024-05-10 PROCEDURE — 2500000004 HC RX 250 GENERAL PHARMACY W/ HCPCS (ALT 636 FOR OP/ED): Performed by: INTERNAL MEDICINE

## 2024-05-10 PROCEDURE — 96365 THER/PROPH/DIAG IV INF INIT: CPT

## 2024-05-10 PROCEDURE — 1210000001 HC SEMI-PRIVATE ROOM DAILY

## 2024-05-10 PROCEDURE — 85379 FIBRIN DEGRADATION QUANT: CPT | Performed by: NURSE PRACTITIONER

## 2024-05-10 PROCEDURE — 2500000002 HC RX 250 W HCPCS SELF ADMINISTERED DRUGS (ALT 637 FOR MEDICARE OP, ALT 636 FOR OP/ED): Mod: MUE | Performed by: INTERNAL MEDICINE

## 2024-05-10 PROCEDURE — 36415 COLL VENOUS BLD VENIPUNCTURE: CPT | Performed by: INTERNAL MEDICINE

## 2024-05-10 PROCEDURE — 83605 ASSAY OF LACTIC ACID: CPT | Performed by: INTERNAL MEDICINE

## 2024-05-10 PROCEDURE — 94640 AIRWAY INHALATION TREATMENT: CPT

## 2024-05-10 PROCEDURE — 93005 ELECTROCARDIOGRAM TRACING: CPT

## 2024-05-10 PROCEDURE — 96375 TX/PRO/DX INJ NEW DRUG ADDON: CPT

## 2024-05-10 PROCEDURE — 71045 X-RAY EXAM CHEST 1 VIEW: CPT

## 2024-05-10 PROCEDURE — 71275 CT ANGIOGRAPHY CHEST: CPT | Performed by: RADIOLOGY

## 2024-05-10 PROCEDURE — 71275 CT ANGIOGRAPHY CHEST: CPT

## 2024-05-10 PROCEDURE — 87040 BLOOD CULTURE FOR BACTERIA: CPT | Mod: AHULAB | Performed by: INTERNAL MEDICINE

## 2024-05-10 PROCEDURE — 84484 ASSAY OF TROPONIN QUANT: CPT | Performed by: NURSE PRACTITIONER

## 2024-05-10 PROCEDURE — 99291 CRITICAL CARE FIRST HOUR: CPT | Mod: 25 | Performed by: INTERNAL MEDICINE

## 2024-05-10 PROCEDURE — 2550000001 HC RX 255 CONTRASTS: Performed by: INTERNAL MEDICINE

## 2024-05-10 PROCEDURE — 99223 1ST HOSP IP/OBS HIGH 75: CPT | Performed by: HOSPITALIST

## 2024-05-10 PROCEDURE — 83880 ASSAY OF NATRIURETIC PEPTIDE: CPT | Performed by: NURSE PRACTITIONER

## 2024-05-10 PROCEDURE — 96367 TX/PROPH/DG ADDL SEQ IV INF: CPT

## 2024-05-10 PROCEDURE — 84484 ASSAY OF TROPONIN QUANT: CPT | Mod: 91 | Performed by: INTERNAL MEDICINE

## 2024-05-10 PROCEDURE — 71045 X-RAY EXAM CHEST 1 VIEW: CPT | Performed by: RADIOLOGY

## 2024-05-10 PROCEDURE — 85025 COMPLETE CBC W/AUTO DIFF WBC: CPT | Performed by: NURSE PRACTITIONER

## 2024-05-10 PROCEDURE — 36415 COLL VENOUS BLD VENIPUNCTURE: CPT | Performed by: NURSE PRACTITIONER

## 2024-05-10 PROCEDURE — 80053 COMPREHEN METABOLIC PANEL: CPT | Performed by: NURSE PRACTITIONER

## 2024-05-10 RX ORDER — IPRATROPIUM BROMIDE AND ALBUTEROL SULFATE 2.5; .5 MG/3ML; MG/3ML
3 SOLUTION RESPIRATORY (INHALATION) EVERY 20 MIN
Status: COMPLETED | OUTPATIENT
Start: 2024-05-10 | End: 2024-05-10

## 2024-05-10 RX ORDER — IPRATROPIUM BROMIDE AND ALBUTEROL SULFATE 2.5; .5 MG/3ML; MG/3ML
3 SOLUTION RESPIRATORY (INHALATION) ONCE
Status: COMPLETED | OUTPATIENT
Start: 2024-05-10 | End: 2024-05-10

## 2024-05-10 RX ORDER — MORPHINE SULFATE 4 MG/ML
4 INJECTION, SOLUTION INTRAMUSCULAR; INTRAVENOUS ONCE
Status: COMPLETED | OUTPATIENT
Start: 2024-05-10 | End: 2024-05-10

## 2024-05-10 RX ORDER — CEFTRIAXONE 1 G/50ML
1 INJECTION, SOLUTION INTRAVENOUS ONCE
Status: COMPLETED | OUTPATIENT
Start: 2024-05-10 | End: 2024-05-10

## 2024-05-10 RX ADMIN — IPRATROPIUM BROMIDE AND ALBUTEROL SULFATE 3 ML: 2.5; .5 SOLUTION RESPIRATORY (INHALATION) at 16:52

## 2024-05-10 RX ADMIN — IPRATROPIUM BROMIDE AND ALBUTEROL SULFATE 3 ML: 2.5; .5 SOLUTION RESPIRATORY (INHALATION) at 16:55

## 2024-05-10 RX ADMIN — IPRATROPIUM BROMIDE AND ALBUTEROL SULFATE 3 ML: 2.5; .5 SOLUTION RESPIRATORY (INHALATION) at 16:53

## 2024-05-10 RX ADMIN — MORPHINE SULFATE 4 MG: 4 INJECTION, SOLUTION INTRAMUSCULAR; INTRAVENOUS at 18:07

## 2024-05-10 RX ADMIN — SODIUM CHLORIDE 1000 ML: 9 INJECTION, SOLUTION INTRAVENOUS at 16:52

## 2024-05-10 RX ADMIN — AZITHROMYCIN MONOHYDRATE 500 MG: 500 INJECTION, POWDER, LYOPHILIZED, FOR SOLUTION INTRAVENOUS at 17:19

## 2024-05-10 RX ADMIN — IPRATROPIUM BROMIDE AND ALBUTEROL SULFATE 3 ML: 2.5; .5 SOLUTION RESPIRATORY (INHALATION) at 21:08

## 2024-05-10 RX ADMIN — IOHEXOL 75 ML: 350 INJECTION, SOLUTION INTRAVENOUS at 18:24

## 2024-05-10 RX ADMIN — CEFTRIAXONE SODIUM 1 G: 1 INJECTION, SOLUTION INTRAVENOUS at 16:52

## 2024-05-10 ASSESSMENT — COLUMBIA-SUICIDE SEVERITY RATING SCALE - C-SSRS
2. HAVE YOU ACTUALLY HAD ANY THOUGHTS OF KILLING YOURSELF?: NO
6. HAVE YOU EVER DONE ANYTHING, STARTED TO DO ANYTHING, OR PREPARED TO DO ANYTHING TO END YOUR LIFE?: NO
1. IN THE PAST MONTH, HAVE YOU WISHED YOU WERE DEAD OR WISHED YOU COULD GO TO SLEEP AND NOT WAKE UP?: NO

## 2024-05-10 ASSESSMENT — PAIN - FUNCTIONAL ASSESSMENT
PAIN_FUNCTIONAL_ASSESSMENT: 0-10
PAIN_FUNCTIONAL_ASSESSMENT: 0-10

## 2024-05-10 ASSESSMENT — PAIN SCALES - GENERAL
PAINLEVEL_OUTOF10: 5 - MODERATE PAIN
PAINLEVEL_OUTOF10: 5 - MODERATE PAIN

## 2024-05-10 ASSESSMENT — ENCOUNTER SYMPTOMS
MUSCULOSKELETAL NEGATIVE: 1
PSYCHIATRIC NEGATIVE: 1
HEMATOLOGIC/LYMPHATIC NEGATIVE: 1
NEUROLOGICAL NEGATIVE: 1
DYSURIA: 0
EYES NEGATIVE: 1
GASTROINTESTINAL NEGATIVE: 1
APPETITE CHANGE: 1
FREQUENCY: 1
ALLERGIC/IMMUNOLOGIC NEGATIVE: 1
SHORTNESS OF BREATH: 1
FEVER: 0
COUGH: 1
CHILLS: 1

## 2024-05-10 ASSESSMENT — PAIN DESCRIPTION - DESCRIPTORS: DESCRIPTORS: ACHING

## 2024-05-10 NOTE — ED TRIAGE NOTES
" TRIAGE NOTE   I saw the patient as the Clinician in Triage and performed a brief history and physical exam, established acuity, and ordered appropriate tests to develop basic plan of care. Patient will be seen by an [facility], resident and/or physician who will independently evaluate the patient. Please see subsequent provider notes for further details and disposition.     Brief HPI: In brief, Norma Erwin \"Torey" is a 68 y.o. male that presents for acute left-sided chest pain and dyspnea.  He is tachycardic and tachypneic.  His respirations are 22 and his heart rate is 108 bpm.  He has pain when he tries to take a deep breath.  He has a history of lung cancer.  The mass was removed February 27, 2023.  He did not require chemo.  He indicated that the surgery took care of everything.  He denies a history of a pulmonary embolism but patient is holding his chest with his left arm.  He quit smoking 1 year ago when everything happened.  He denies fever but he had a slight fever of 37.3.  He experienced chills last night.  He denies hemoptysis.  He has had some heavy production of phlegm.  He denies abdominal pain.  He denies nausea or vomiting.  Denies any recent trauma or fall.  Positive Renteria sign.    Focused Physical exam:   Patient had clear bilateral lung sounds.  Normal S1-S2 and he was tachycardic and moaning in pain..  He was presenting with a positive Renteria sign.  Plan/MDM:   The way patient was holding his chest I was very concerned.  He was also tachycardic.  With his history of lung cancer he is a prime candidate with a Wells criteria for PE risk score of 3.  A CT chest was ordered.  His heart risk score is 3.  Cardiac workup was also ordered.  His first EKG was sinus tachycardia without any ST elevation or depression.  There was no right strain appreciated on EKG.  FL interval was normal and QT corrected was normal.  Please see subsequent provider note for further details and disposition   "

## 2024-05-10 NOTE — ED TRIAGE NOTES
PT TO ED FROM HOME WITH C/O COUGH AND LEFT SIDED CHEST PAIN WITH WORSENING SOB ON EXERTION WITH LUNG CA HX THAT STARTED LAST NIGHT.

## 2024-05-10 NOTE — ED PROVIDER NOTES
HPI     CC: Chest Pain, Shortness of Breath, and Cough     HPI: Norma Erwin is a 68 y.o. male ex-smoker with a history of COPD, right-sided lung cancer s/p resection in remission, presents with left-sided chest pain, cough, and shortness of breath.  Symptoms started last night.  No history of trauma.  He has an associated cough productive of some phlegm.  It hurts to take a deep breath and cough.  He denies fevers but endorses chills.  Denies dizziness, abdominal pain, nausea, vomiting, diarrhea, or urinary symptoms.  Denies leg swelling or significant orthopnea.    ROS: 10-point review of systems was performed and is otherwise negative except as noted in HPI.    Limitations to history: N/A    Independent Historians: N/A    External Records Reviewed: Outpatient notes in EMR, [place] summary from 2/2023    Past Medical History: Noncontributory except per HPI     Past Surgical History: Noncontributory except per HPI     Family History: Reviewed and noncontributory     Social History: Ex-smoker.  Drinks 2-3 vodka per day. Denies illicit drugs.    Social Determinants Affecting Care: N/A    No Known Allergies    Home Meds:   Current Outpatient Medications   Medication Instructions    albuterol 2.5 mg    Anoro Ellipta 62.5-25 mcg/actuation blister with device inhalation    chlorhexidine (Peridex) 0.12 % solution RINSE MOUTH WITH 15 ML (1 CAPFUL) FOR 30 SECONDS IN THE MORNING AND IN THE EVENING AFTER TOOHBRUSHING, EXPECTORATE AFTER RINSING, DO NOT SWALLOW    Edna-Hex 4 % external liquid use as directed    furosemide (Lasix) 20 mg tablet 1 tablet, oral, 2 times daily    ibuprofen 600 mg tablet oral, Every 6 hours PRN    ibuprofen 800 mg, oral, Every 6 hours PRN    ipratropium (Atrovent) 0.02 % nebulizer solution INHALE 1 VIAL IN NEBULIZER EVERY 4 HOURS AS NEEDED    ipratropium-albuteroL (Duo-Neb) 0.5-2.5 mg/3 mL nebulizer solution 3 mL, inhalation, Every 4 hours PRN    multivitamin with minerals iron-free (Centrum Silver) 1  "tablet, oral, Daily    oxyCODONE (Roxicodone) 5 mg immediate release tablet TAKE 1 TABLET BY MOUTH EVERY 6 HOURS AS NEEDED FOR PAIN AS DIRECTED BY PHYSICIAN -- MODERATE (4-6)    predniSONE (Deltasone) 20 mg tablet oral    Trelegy Ellipta 200-62.5-25 mcg blister with device INHALE 1 DOSE INTO LUNGS AS INSTRUCTED BY PHYSICIAN ONCE DAILY    Ventolin HFA 90 mcg/actuation inhaler 2 puffs, inhalation, Every 4 hours PRN        Physical Exam     ED Triage Vitals [05/10/24 1522]   Temperature Heart Rate Respirations BP   37.3 °C (99.1 °F) (!) 108 (!) 22 132/83      Pulse Ox Temp src Heart Rate Source Patient Position   98 % -- -- --      BP Location FiO2 (%)     -- --         Heart Rate:  [101-111]   Temperature:  [37.3 °C (99.1 °F)]   Respirations:  [18-28]   BP: (104-132)/(63-83)   Height:  [180.3 cm (5' 11\")]   Weight:  [109 kg (240 lb)]   Pulse Ox:  [94 %-100 %]      Physical Exam  Vitals and nursing note reviewed.     CONSTITUTIONAL: Uncomfortable appearing, clutching left chest, mildly tachypneic  HENMT: Head atraumatic. Airway patent. Nasal mucosa clear. Mouth with normal mucosa, clear oropharynx. Uvula midline. Neck supple.    EYES: Clear bilaterally, pupils equally round and reactive to light.   CARDIOVASCULAR: Tachycardic, regular rhythm.  Heart sounds S1, S2.  No murmurs, rubs or gallops. Normal pulses. Capillary refill < 2 sec.   RESPIRATORY: Tachypneic, diminished breath sounds on left base, scattered rhonchi, no notable wheeze  GASTROINTESTINAL: Abdomen soft, non-distended, non-tender. No rebound, no guarding. Normal bowel sounds. No palpable masses.  GENITOURINARY:  No CVA tenderness.  MUSCULOSKELETAL: Spine appears normal, range of motion is not limited, no muscle or joint tenderness. No edema.   NEUROLOGICAL: Alert and oriented, no asymmetry, moving all extremities equally.  SKIN: Warm, dry and intact. No rash or notable lesions.  PSYCHIATRIC: Normal mood and affect.  HEME/LYMPH: No adenopathy or " splenomegaly.    Diagnostic Results      ECG: ECGs read and interpreted by me. See ED Course, below, for interpretation.    Labs Reviewed   CBC WITH AUTO DIFFERENTIAL - Abnormal       Result Value    WBC 22.8 (*)     nRBC 0.0      RBC 4.53      Hemoglobin 13.7      Hematocrit 42.1      MCV 93      MCH 30.2      MCHC 32.5      RDW 14.0      Platelets 218      Neutrophils % 85.3      Immature Granulocytes %, Automated 0.7      Lymphocytes % 7.4      Monocytes % 6.3      Eosinophils % 0.1      Basophils % 0.2      Neutrophils Absolute 19.45 (*)     Immature Granulocytes Absolute, Automated 0.15      Lymphocytes Absolute 1.68      Monocytes Absolute 1.43 (*)     Eosinophils Absolute 0.02      Basophils Absolute 0.05     COMPREHENSIVE METABOLIC PANEL - Abnormal    Glucose 106 (*)     Sodium 137      Potassium 4.0      Chloride 99      Bicarbonate 27      Anion Gap 15      Urea Nitrogen 23      Creatinine 1.25      eGFR 62      Calcium 9.0      Albumin 4.2      Alkaline Phosphatase 81      Total Protein 7.5      AST 17      Bilirubin, Total 0.7      ALT 24     D-DIMER, VTE EXCLUSION - Abnormal    D-Dimer, Quantitative VTE Exclusion 758 (*)     Narrative:     The VTE Exclusion D-Dimer assay is reported in ng/mL Fibrinogen Equivalent Units (FEU).    Per 's instructions for use, a value of less than 500 ng/mL (FEU) may help to exclude DVT or PE in outpatients when the assay is used with a clinical pretest probability assessment.([facility] must utilize and document eCalc 'Wells Score Deep Vein Thrombosis Risk' for DVT exclusion only. Emergency Department should utilize  Guidelines for Emergency Department Use of the VTE Exclusion D-Dimer and Clinical Pretest probability assessment model for DVT or PE exclusion.)   MAGNESIUM - Normal    Magnesium 2.10     TROPONIN I, HIGH SENSITIVITY - Normal    Troponin I, High Sensitivity 8      Narrative:     Less than 99th percentile of normal range cutoff-  Female and children  under 18 years old <14 ng/L; Male <21 ng/L: Negative  Repeat testing should be performed if clinically indicated.     Female and children under 18 years old 14-50 ng/L; Male 21-50 ng/L:  Consistent with possible cardiac damage and possible increased clinical   risk. Serial measurements may help to assess extent of myocardial damage.     >50 ng/L: Consistent with cardiac damage, increased clinical risk and  myocardial infarction. Serial measurements may help assess extent of   myocardial damage.      NOTE: Children less than 1 year old may have higher baseline troponin   levels and results should be interpreted in conjunction with the overall   clinical context.     NOTE: Troponin I testing is performed using a different   testing methodology at [facility] than at other   [facility]. Direct result comparisons should only   be made within the same method.   B-TYPE NATRIURETIC PEPTIDE - Normal    BNP 48      Narrative:        <100 pg/mL - Heart failure unlikely  100-299 pg/mL - Intermediate probability of acute heart                  failure exacerbation. Correlate with clinical                  context and patient history.    >=300 pg/mL - Heart Failure likely. Correlate with clinical                  context and patient history.    BNP testing is performed using different testing methodology at [facility] than at other [facility]. Direct result comparisons should only be made within the same method.      BLOOD CULTURE   BLOOD CULTURE         CT angio chest for pulmonary embolism   Final Result   No evidence of acute pulmonary embolism.        Consolidative opacity in the left upper lobe compatible with   pneumonia. Short-term progress images recommended after treatment to   assure resolution and exclude other underlying obscured pathology.        Small right pleural effusion and postsurgical change of partial   resection in right lower lobe and basilar atelectasis and/or  scarring.        MACRO:   None        Signed by: Rod Cramer 5/10/2024 7:08 PM   Dictation workstation:   [id number]      XR chest 1 view   Final Result   1.  Atelectasis or focal infiltrate since the prior exam.        Signed by: Shamir Chaney 5/10/2024 4:45 PM   Dictation workstation:   [id number]                    [place] Coma Scale Score: 15                  Procedure  Critical Care    Performed by: Sravanthi Pierre MD  Authorized by: Sravanthi Pierre MD    Critical care provider statement:     Critical care time (minutes):  35    Critical care time was exclusive of:  Teaching time and separately billable procedures and treating other patients    Critical care was necessary to treat or prevent imminent or life-threatening deterioration of the following conditions:  Sepsis    Critical care was time spent personally by me on the following activities:  Development of treatment plan with patient or surrogate, discussions with primary provider, evaluation of patient's response to treatment, examination of patient, obtaining history from patient or surrogate, ordering and performing treatments and interventions, ordering and review of laboratory studies, ordering and review of radiographic studies, pulse oximetry, re-evaluation of patient's condition and review of old charts    Care discussed with: admitting provider        ED Course & MDM   Assessment/Plan:   Norma Erwin is a 68 y.o. male ex-smoker with a history of COPD, right-sided lung cancer s/p resection in remission, presents with left-sided chest pain, cough, and shortness of breath.  Patient is notably tachycardic and tachypneic but saturating well on room air.  He has diminished breath sounds at the left base raising the question of possible effusion or pneumonia.  Other possibilities include COPD exacerbation although no notable wheeze, pneumothorax, recurrent lung cancer, PE although no signs of DVT on exam.  ECG is generally nonischemic.   Workup was initiated with ECG, labs, chest x-ray.  Treatment was initiated with IV morphine and DuoNebs. See below for details of ED course and ultimate disposition.    Medications   cefTRIAXone (Rocephin) IVPB 1 g (0 g intravenous Stopped 5/10/24 1722)   azithromycin (Zithromax) in dextrose 5 % in water (D5W) 250 mL  mg (0 mg intravenous Stopped 5/10/24 1819)   sodium chloride 0.9 % bolus 1,000 mL (0 mL intravenous Stopped 5/10/24 1754)   ipratropium-albuteroL (Duo-Neb) 0.5-2.5 mg/3 mL nebulizer solution 3 mL (3 mL nebulization Given 5/10/24 1655)   morphine injection 4 mg (4 mg intravenous Given 5/10/24 1807)   iohexol (OMNIPaque) 350 mg iodine/mL solution 75 mL (75 mL intravenous Given 5/10/24 1824)        ED Course as of 05/10/24 2031   Fri May 10, 2024   1522 ECG read interpreted by me.  Sinus tachycardia, rate 107.  Normal axis.  Normal intervals.  No ST or T wave derangements. [CG]   1648 CXR shows atelectasis or focal infiltrate since the prior exam. [CG]   1648 WBC noted to be 22. Sepsis alert activated. Blood cultures drawn, ceftriaxone and azithromycin ordered in addition to a liter bolus.  [CG]   1712 Labs notable for leukocytosis 22.8 otherwise normal CBC, CMP unremarkable, normal troponin and BNP, normal magnesium, borderline D-dimer 758. [CG]   1911 CT PE shows no PE, does show consolidative opacity in the left upper lobe compatible with pneumonia and a small right pleural effusion with postsurgical changes. [CG]   2030 Patient feeling significantly better.  Updated on findings and plan, concern for pneumosepsis.  Patient was admitted under Dr. Matthew for further management. [CG]      ED Course User Index  [CG] Sravanthi Pierre MD         Diagnoses as of 05/10/24 2031   HCAP (healthcare-associated pneumonia)   Sepsis, due to unspecified organism, unspecified whether acute organ dysfunction present (Multi)   Chest pain, unspecified type       Disposition:   Admitted to Hospitalist team,  discussed differential and findings with patient as well as any family members at bedside.      ED Prescriptions    None         Sravanthi Pierre MD  EM/IM/Peds    This note was dictated by speech recognition. Minor errors in transcription may be present.     Sravanthi Pierre MD  05/10/24 2031

## 2024-05-11 LAB
ANION GAP SERPL CALC-SCNC: 13 MMOL/L (ref 10–20)
BASOPHILS # BLD AUTO: 0.01 X10*3/UL (ref 0–0.1)
BASOPHILS NFR BLD AUTO: 0.1 %
BUN SERPL-MCNC: 18 MG/DL (ref 6–23)
CALCIUM SERPL-MCNC: 8.3 MG/DL (ref 8.6–10.3)
CHLORIDE SERPL-SCNC: 105 MMOL/L (ref 98–107)
CO2 SERPL-SCNC: 24 MMOL/L (ref 21–32)
CREAT SERPL-MCNC: 1.05 MG/DL (ref 0.5–1.3)
EGFRCR SERPLBLD CKD-EPI 2021: 77 ML/MIN/1.73M*2
EOSINOPHIL # BLD AUTO: 0 X10*3/UL (ref 0–0.7)
EOSINOPHIL NFR BLD AUTO: 0 %
ERYTHROCYTE [DISTWIDTH] IN BLOOD BY AUTOMATED COUNT: 14.1 % (ref 11.5–14.5)
GLUCOSE BLD MANUAL STRIP-MCNC: 142 MG/DL (ref 74–99)
GLUCOSE BLD MANUAL STRIP-MCNC: 170 MG/DL (ref 74–99)
GLUCOSE SERPL-MCNC: 151 MG/DL (ref 74–99)
HCT VFR BLD AUTO: 39 % (ref 41–52)
HGB BLD-MCNC: 12.6 G/DL (ref 13.5–17.5)
HOLD SPECIMEN: NORMAL
IMM GRANULOCYTES # BLD AUTO: 0.14 X10*3/UL (ref 0–0.7)
IMM GRANULOCYTES NFR BLD AUTO: 0.9 % (ref 0–0.9)
LYMPHOCYTES # BLD AUTO: 0.49 X10*3/UL (ref 1.2–4.8)
LYMPHOCYTES NFR BLD AUTO: 3.1 %
MAGNESIUM SERPL-MCNC: 2.1 MG/DL (ref 1.6–2.4)
MCH RBC QN AUTO: 30.7 PG (ref 26–34)
MCHC RBC AUTO-ENTMCNC: 32.3 G/DL (ref 32–36)
MCV RBC AUTO: 95 FL (ref 80–100)
MONOCYTES # BLD AUTO: 0.32 X10*3/UL (ref 0.1–1)
MONOCYTES NFR BLD AUTO: 2 %
NEUTROPHILS # BLD AUTO: 14.91 X10*3/UL (ref 1.2–7.7)
NEUTROPHILS NFR BLD AUTO: 93.9 %
NRBC BLD-RTO: 0 /100 WBCS (ref 0–0)
PLATELET # BLD AUTO: 192 X10*3/UL (ref 150–450)
POTASSIUM SERPL-SCNC: 4 MMOL/L (ref 3.5–5.3)
RBC # BLD AUTO: 4.11 X10*6/UL (ref 4.5–5.9)
SODIUM SERPL-SCNC: 138 MMOL/L (ref 136–145)
WBC # BLD AUTO: 15.9 X10*3/UL (ref 4.4–11.3)

## 2024-05-11 PROCEDURE — 36415 COLL VENOUS BLD VENIPUNCTURE: CPT | Performed by: HOSPITALIST

## 2024-05-11 PROCEDURE — 82947 ASSAY GLUCOSE BLOOD QUANT: CPT | Mod: 91

## 2024-05-11 PROCEDURE — 83735 ASSAY OF MAGNESIUM: CPT | Performed by: HOSPITALIST

## 2024-05-11 PROCEDURE — 87449 NOS EACH ORGANISM AG IA: CPT | Mod: AHULAB | Performed by: HOSPITALIST

## 2024-05-11 PROCEDURE — 2500000004 HC RX 250 GENERAL PHARMACY W/ HCPCS (ALT 636 FOR OP/ED): Performed by: HOSPITALIST

## 2024-05-11 PROCEDURE — 99233 SBSQ HOSP IP/OBS HIGH 50: CPT | Performed by: INTERNAL MEDICINE

## 2024-05-11 PROCEDURE — 87899 AGENT NOS ASSAY W/OPTIC: CPT | Mod: AHULAB | Performed by: HOSPITALIST

## 2024-05-11 PROCEDURE — 82374 ASSAY BLOOD CARBON DIOXIDE: CPT | Performed by: HOSPITALIST

## 2024-05-11 PROCEDURE — 1100000001 HC PRIVATE ROOM DAILY

## 2024-05-11 PROCEDURE — 2500000002 HC RX 250 W HCPCS SELF ADMINISTERED DRUGS (ALT 637 FOR MEDICARE OP, ALT 636 FOR OP/ED): Performed by: HOSPITALIST

## 2024-05-11 PROCEDURE — 94640 AIRWAY INHALATION TREATMENT: CPT

## 2024-05-11 PROCEDURE — 85025 COMPLETE CBC W/AUTO DIFF WBC: CPT | Performed by: HOSPITALIST

## 2024-05-11 PROCEDURE — 82947 ASSAY GLUCOSE BLOOD QUANT: CPT

## 2024-05-11 PROCEDURE — 2500000005 HC RX 250 GENERAL PHARMACY W/O HCPCS: Performed by: HOSPITALIST

## 2024-05-11 RX ORDER — IPRATROPIUM BROMIDE AND ALBUTEROL SULFATE 2.5; .5 MG/3ML; MG/3ML
3 SOLUTION RESPIRATORY (INHALATION) EVERY 2 HOUR PRN
Status: DISCONTINUED | OUTPATIENT
Start: 2024-05-11 | End: 2024-05-13 | Stop reason: HOSPADM

## 2024-05-11 RX ORDER — ENOXAPARIN SODIUM 100 MG/ML
40 INJECTION SUBCUTANEOUS EVERY 24 HOURS
Status: DISCONTINUED | OUTPATIENT
Start: 2024-05-11 | End: 2024-05-13 | Stop reason: HOSPADM

## 2024-05-11 RX ORDER — POLYETHYLENE GLYCOL 3350 17 G/17G
17 POWDER, FOR SOLUTION ORAL DAILY
Status: DISCONTINUED | OUTPATIENT
Start: 2024-05-11 | End: 2024-05-13 | Stop reason: HOSPADM

## 2024-05-11 RX ORDER — SODIUM CHLORIDE 9 MG/ML
100 INJECTION, SOLUTION INTRAVENOUS CONTINUOUS
Status: DISCONTINUED | OUTPATIENT
Start: 2024-05-11 | End: 2024-05-11

## 2024-05-11 RX ORDER — BENZONATATE 100 MG/1
100 CAPSULE ORAL 3 TIMES DAILY PRN
Status: DISCONTINUED | OUTPATIENT
Start: 2024-05-11 | End: 2024-05-13 | Stop reason: HOSPADM

## 2024-05-11 RX ORDER — ACETAMINOPHEN 325 MG/1
650 TABLET ORAL EVERY 4 HOURS PRN
Status: DISCONTINUED | OUTPATIENT
Start: 2024-05-11 | End: 2024-05-13 | Stop reason: HOSPADM

## 2024-05-11 RX ORDER — IPRATROPIUM BROMIDE AND ALBUTEROL SULFATE 2.5; .5 MG/3ML; MG/3ML
3 SOLUTION RESPIRATORY (INHALATION)
Status: DISCONTINUED | OUTPATIENT
Start: 2024-05-11 | End: 2024-05-11

## 2024-05-11 RX ORDER — ONDANSETRON HYDROCHLORIDE 2 MG/ML
4 INJECTION, SOLUTION INTRAVENOUS EVERY 8 HOURS PRN
Status: DISCONTINUED | OUTPATIENT
Start: 2024-05-11 | End: 2024-05-13 | Stop reason: HOSPADM

## 2024-05-11 RX ORDER — MORPHINE SULFATE 2 MG/ML
2 INJECTION, SOLUTION INTRAMUSCULAR; INTRAVENOUS EVERY 4 HOURS PRN
Status: DISCONTINUED | OUTPATIENT
Start: 2024-05-11 | End: 2024-05-13 | Stop reason: HOSPADM

## 2024-05-11 RX ORDER — GUAIFENESIN 100 MG/5ML
200 SOLUTION ORAL EVERY 4 HOURS PRN
Status: DISCONTINUED | OUTPATIENT
Start: 2024-05-11 | End: 2024-05-13 | Stop reason: HOSPADM

## 2024-05-11 RX ORDER — MORPHINE SULFATE 2 MG/ML
1 INJECTION, SOLUTION INTRAMUSCULAR; INTRAVENOUS EVERY 4 HOURS PRN
Status: DISCONTINUED | OUTPATIENT
Start: 2024-05-11 | End: 2024-05-13 | Stop reason: HOSPADM

## 2024-05-11 RX ORDER — IPRATROPIUM BROMIDE AND ALBUTEROL SULFATE 2.5; .5 MG/3ML; MG/3ML
3 SOLUTION RESPIRATORY (INHALATION)
Status: DISCONTINUED | OUTPATIENT
Start: 2024-05-11 | End: 2024-05-13 | Stop reason: HOSPADM

## 2024-05-11 RX ORDER — ONDANSETRON 4 MG/1
4 TABLET, ORALLY DISINTEGRATING ORAL EVERY 8 HOURS PRN
Status: DISCONTINUED | OUTPATIENT
Start: 2024-05-11 | End: 2024-05-13 | Stop reason: HOSPADM

## 2024-05-11 RX ADMIN — METHYLPREDNISOLONE SODIUM SUCCINATE 40 MG: 125 INJECTION, POWDER, FOR SOLUTION INTRAMUSCULAR; INTRAVENOUS at 07:45

## 2024-05-11 RX ADMIN — Medication 2 L/MIN: at 21:06

## 2024-05-11 RX ADMIN — IPRATROPIUM BROMIDE AND ALBUTEROL SULFATE 3 ML: 2.5; .5 SOLUTION RESPIRATORY (INHALATION) at 21:06

## 2024-05-11 RX ADMIN — Medication 2 L/MIN: at 00:20

## 2024-05-11 RX ADMIN — ENOXAPARIN SODIUM 40 MG: 40 INJECTION SUBCUTANEOUS at 09:43

## 2024-05-11 RX ADMIN — SODIUM CHLORIDE 1000 ML: 9 INJECTION, SOLUTION INTRAVENOUS at 00:52

## 2024-05-11 RX ADMIN — METHYLPREDNISOLONE SODIUM SUCCINATE 40 MG: 125 INJECTION, POWDER, FOR SOLUTION INTRAMUSCULAR; INTRAVENOUS at 00:52

## 2024-05-11 RX ADMIN — IPRATROPIUM BROMIDE AND ALBUTEROL SULFATE 3 ML: 2.5; .5 SOLUTION RESPIRATORY (INHALATION) at 10:53

## 2024-05-11 RX ADMIN — METHYLPREDNISOLONE SODIUM SUCCINATE 40 MG: 125 INJECTION, POWDER, FOR SOLUTION INTRAMUSCULAR; INTRAVENOUS at 23:25

## 2024-05-11 RX ADMIN — IPRATROPIUM BROMIDE AND ALBUTEROL SULFATE 3 ML: 2.5; .5 SOLUTION RESPIRATORY (INHALATION) at 07:18

## 2024-05-11 RX ADMIN — IPRATROPIUM BROMIDE AND ALBUTEROL SULFATE 3 ML: 2.5; .5 SOLUTION RESPIRATORY (INHALATION) at 15:53

## 2024-05-11 RX ADMIN — AZITHROMYCIN MONOHYDRATE 500 MG: 500 INJECTION, POWDER, LYOPHILIZED, FOR SOLUTION INTRAVENOUS at 17:12

## 2024-05-11 RX ADMIN — Medication 4 L/MIN: at 07:20

## 2024-05-11 RX ADMIN — Medication 2 L/MIN: at 15:53

## 2024-05-11 RX ADMIN — SODIUM CHLORIDE 100 ML/HR: 9 INJECTION, SOLUTION INTRAVENOUS at 02:01

## 2024-05-11 RX ADMIN — CEFTRIAXONE 2 G: 2 INJECTION, POWDER, FOR SOLUTION INTRAMUSCULAR; INTRAVENOUS at 16:32

## 2024-05-11 RX ADMIN — METHYLPREDNISOLONE SODIUM SUCCINATE 40 MG: 125 INJECTION, POWDER, FOR SOLUTION INTRAMUSCULAR; INTRAVENOUS at 16:31

## 2024-05-11 SDOH — SOCIAL STABILITY: SOCIAL INSECURITY: ARE THERE ANY APPARENT SIGNS OF INJURIES/BEHAVIORS THAT COULD BE RELATED TO ABUSE/NEGLECT?: NO

## 2024-05-11 SDOH — SOCIAL STABILITY: SOCIAL INSECURITY: HAS ANYONE EVER THREATENED TO HURT YOUR FAMILY OR YOUR PETS?: NO

## 2024-05-11 SDOH — SOCIAL STABILITY: SOCIAL INSECURITY: HAVE YOU HAD ANY THOUGHTS OF HARMING ANYONE ELSE?: NO

## 2024-05-11 SDOH — SOCIAL STABILITY: SOCIAL INSECURITY: HAVE YOU HAD THOUGHTS OF HARMING ANYONE ELSE?: NO

## 2024-05-11 SDOH — SOCIAL STABILITY: SOCIAL INSECURITY: DO YOU FEEL UNSAFE GOING BACK TO THE PLACE WHERE YOU ARE LIVING?: NO

## 2024-05-11 SDOH — SOCIAL STABILITY: SOCIAL INSECURITY: WERE YOU ABLE TO COMPLETE ALL THE BEHAVIORAL HEALTH SCREENINGS?: YES

## 2024-05-11 SDOH — SOCIAL STABILITY: SOCIAL INSECURITY: ARE YOU OR HAVE YOU BEEN THREATENED OR ABUSED PHYSICALLY, EMOTIONALLY, OR SEXUALLY BY ANYONE?: NO

## 2024-05-11 SDOH — SOCIAL STABILITY: SOCIAL INSECURITY: DOES ANYONE TRY TO KEEP YOU FROM HAVING/CONTACTING OTHER FRIENDS OR DOING THINGS OUTSIDE YOUR HOME?: NO

## 2024-05-11 SDOH — SOCIAL STABILITY: SOCIAL INSECURITY: DO YOU FEEL ANYONE HAS EXPLOITED OR TAKEN ADVANTAGE OF YOU FINANCIALLY OR OF YOUR PERSONAL PROPERTY?: NO

## 2024-05-11 ASSESSMENT — COGNITIVE AND FUNCTIONAL STATUS - GENERAL
MOBILITY SCORE: 24
PATIENT BASELINE BEDBOUND: NO
DAILY ACTIVITIY SCORE: 24

## 2024-05-11 ASSESSMENT — LIFESTYLE VARIABLES
SKIP TO QUESTIONS 9-10: 1
PRESCIPTION_ABUSE_PAST_12_MONTHS: NO
AUDIT-C TOTAL SCORE: 4
SUBSTANCE_ABUSE_PAST_12_MONTHS: NO
HOW OFTEN DO YOU HAVE A DRINK CONTAINING ALCOHOL: 4 OR MORE TIMES A WEEK
HOW MANY STANDARD DRINKS CONTAINING ALCOHOL DO YOU HAVE ON A TYPICAL DAY: 1 OR 2
AUDIT-C TOTAL SCORE: 4
HOW OFTEN DO YOU HAVE 6 OR MORE DRINKS ON ONE OCCASION: NEVER

## 2024-05-11 ASSESSMENT — ACTIVITIES OF DAILY LIVING (ADL)
HEARING - RIGHT EAR: FUNCTIONAL
WALKS IN HOME: INDEPENDENT
PATIENT'S MEMORY ADEQUATE TO SAFELY COMPLETE DAILY ACTIVITIES?: YES
JUDGMENT_ADEQUATE_SAFELY_COMPLETE_DAILY_ACTIVITIES: YES
TOILETING: INDEPENDENT
DRESSING YOURSELF: INDEPENDENT
BATHING: INDEPENDENT
GROOMING: INDEPENDENT
FEEDING YOURSELF: INDEPENDENT
HEARING - LEFT EAR: FUNCTIONAL
ADEQUATE_TO_COMPLETE_ADL: YES

## 2024-05-11 ASSESSMENT — PAIN SCALES - GENERAL
PAINLEVEL_OUTOF10: 0 - NO PAIN
PAINLEVEL_OUTOF10: 0 - NO PAIN

## 2024-05-11 ASSESSMENT — PATIENT HEALTH QUESTIONNAIRE - PHQ9
1. LITTLE INTEREST OR PLEASURE IN DOING THINGS: NOT AT ALL
SUM OF ALL RESPONSES TO PHQ9 QUESTIONS 1 & 2: 0
2. FEELING DOWN, DEPRESSED OR HOPELESS: NOT AT ALL

## 2024-05-11 NOTE — H&P
"History Of Present Illness  Lad ANNIA Erwin is a 68 y.o. male with a PMH of COPD, SCC s/p partial lobectomy right, CARLOS (CPAP) presenting with left sided rib pain and SOB.    Symptoms started two days ago with dec appetite.   Left sided pleuritic pain and SOB  started yesterday.  Pain occurs with deep inspiration only, feels like \"ice picks\".   Has the occ cough, productive of phlegm, which is his norm with his COPD, however, the sputum has been green for the past 2 weeks.   Attempted to relieve the SOB with 3 albuterol nebulizers back to back yesterday, which helped with his breathing but not his left sided chest pain.   Denies fever, has had chills.   Does not use oxygen at home.   Has never required intubation for COPD exacerbation.     On ROS, c/o frequent urination last night, every hour. Denies dysuria.     Work up in the  ED showed WBC ct 22.8  D dimer elevated; CTPE showed no PE, showed ALVINO PNA.   Pt started on Ceftriaxone and azithromycin in the ED.        Past Medical History  COPD  CARLOS    Surgical History  Past Surgical History:   Procedure Laterality Date    CT GUIDED PERCUTANEOUS BIOPSY LUNG  1/12/2023    CT GUIDED PERCUTANEOUS BIOPSY LUNG 1/12/2023 DOCTOR OFFICE [facility]    OTHER SURGICAL HISTORY  11/28/2022    Finger amputation        Social History  He reports that he has quit smoking. His smoking use included cigarettes. He has never used smokeless tobacco. No history on file for alcohol use and drug use.    Family History  No family history on file.     Allergies  Patient has no known allergies.    Review of Systems   Constitutional:  Positive for appetite change and chills. Negative for fever.   HENT: Negative.     Eyes: Negative.    Respiratory:  Positive for cough and shortness of breath.    Cardiovascular:  Positive for chest pain.        Left sided CP   Gastrointestinal: Negative.    Genitourinary:  Positive for frequency. Negative for dysuria.   Musculoskeletal: Negative.    Skin: Negative.  " "  Allergic/Immunologic: Negative.    Neurological: Negative.    Hematological: Negative.    Psychiatric/Behavioral: Negative.          Physical Exam  Vitals reviewed.   Constitutional:       Appearance: Normal appearance.      Comments: Appears comfortable. Is eating dinner.    HENT:      Head: Normocephalic and atraumatic.      Mouth/Throat:      Mouth: Mucous membranes are moist.   Eyes:      Extraocular Movements: Extraocular movements intact.      Conjunctiva/sclera: Conjunctivae normal.      Pupils: Pupils are equal, round, and reactive to light.   Cardiovascular:      Rate and Rhythm: Regular rhythm. Tachycardia present.      Pulses: Normal pulses.      Heart sounds: Normal heart sounds.      Comments: Difficult to auscultate heart sounds.  Pulmonary:      Effort: Pulmonary effort is normal.      Comments: Diffuse end expiratory wheezes bilaterally    Abdominal:      General: Abdomen is flat. Bowel sounds are normal.      Palpations: Abdomen is soft.   Musculoskeletal:         General: Normal range of motion.      Right lower leg: No edema.      Left lower leg: No edema.   Skin:     General: Skin is warm and dry.   Neurological:      General: No focal deficit present.      Mental Status: He is alert and oriented to person, place, and time.   Psychiatric:         Mood and Affect: Mood normal.         Behavior: Behavior normal.         Thought Content: Thought content normal.         Judgment: Judgment normal.          Last Recorded Vitals  Blood pressure (!) 134/98, pulse (!) 105, temperature 37.3 °C (99.1 °F), resp. rate (!) 27, height 1.803 m (5' 11\"), weight 109 kg (240 lb), SpO2 95%.    Relevant Results        Results for orders placed or performed during the hospital encounter of 05/10/24 (from the past 24 hour(s))   CBC and Auto Differential   Result Value Ref Range    WBC 22.8 (H) 4.4 - 11.3 x10*3/uL    nRBC 0.0 0.0 - 0.0 /100 WBCs    RBC 4.53 4.50 - 5.90 x10*6/uL    Hemoglobin 13.7 13.5 - 17.5 g/dL    " Hematocrit 42.1 41.0 - 52.0 %    MCV 93 80 - 100 fL    MCH 30.2 26.0 - 34.0 pg    MCHC 32.5 32.0 - 36.0 g/dL    RDW 14.0 11.5 - 14.5 %    Platelets 218 150 - 450 x10*3/uL    Neutrophils % 85.3 40.0 - 80.0 %    Immature Granulocytes %, Automated 0.7 0.0 - 0.9 %    Lymphocytes % 7.4 13.0 - 44.0 %    Monocytes % 6.3 2.0 - 10.0 %    Eosinophils % 0.1 0.0 - 6.0 %    Basophils % 0.2 0.0 - 2.0 %    Neutrophils Absolute 19.45 (H) 1.20 - 7.70 x10*3/uL    Immature Granulocytes Absolute, Automated 0.15 0.00 - 0.70 x10*3/uL    Lymphocytes Absolute 1.68 1.20 - 4.80 x10*3/uL    Monocytes Absolute 1.43 (H) 0.10 - 1.00 x10*3/uL    Eosinophils Absolute 0.02 0.00 - 0.70 x10*3/uL    Basophils Absolute 0.05 0.00 - 0.10 x10*3/uL   Magnesium   Result Value Ref Range    Magnesium 2.10 1.60 - 2.40 mg/dL   Comprehensive metabolic panel   Result Value Ref Range    Glucose 106 (H) 74 - 99 mg/dL    Sodium 137 136 - 145 mmol/L    Potassium 4.0 3.5 - 5.3 mmol/L    Chloride 99 98 - 107 mmol/L    Bicarbonate 27 21 - 32 mmol/L    Anion Gap 15 10 - 20 mmol/L    Urea Nitrogen 23 6 - 23 mg/dL    Creatinine 1.25 0.50 - 1.30 mg/dL    eGFR 62 >60 mL/min/1.73m*2    Calcium 9.0 8.6 - 10.3 mg/dL    Albumin 4.2 3.4 - 5.0 g/dL    Alkaline Phosphatase 81 33 - 136 U/L    Total Protein 7.5 6.4 - 8.2 g/dL    AST 17 9 - 39 U/L    Bilirubin, Total 0.7 0.0 - 1.2 mg/dL    ALT 24 10 - 52 U/L   Troponin I, High Sensitivity   Result Value Ref Range    Troponin I, High Sensitivity 8 0 - 20 ng/L   D-Dimer, VTE Exclusion   Result Value Ref Range    D-Dimer, Quantitative VTE Exclusion 758 (H) <=500 ng/mL FEU   B-Type Natriuretic Peptide   Result Value Ref Range    BNP 48 0 - 99 pg/mL   Troponin I, High Sensitivity   Result Value Ref Range    Troponin I, High Sensitivity 5 0 - 20 ng/L   Lactate   Result Value Ref Range    Lactate 1.9 0.4 - 2.0 mmol/L     CT angio chest for pulmonary embolism    Result Date: 5/10/2024  Interpreted By:  Rod Cramer, STUDY: CT ANGIO CHEST FOR  PULMONARY EMBOLISM;  5/10/2024 6:28 pm   INDICATION: Signs/Symptoms:sob and pain.   COMPARISON: 2/27/2024, 4/21/2023, 3/13/2024   ACCESSION NUMBER(S): [id number]   ORDERING CLINICIAN: FLAKO HILL   TECHNIQUE: Contiguous axial images of the chest were obtained after the intravenous administration of  contrast. Coronal and sagittal reformatted images were obtained from the axial images. MIPS of the chest were also performed and reviewed.   FINDINGS: No axillary, mediastinal, hilar lymphadenopathy.   The heart is normal in size. Coronary artery atherosclerotic calcifications. No significant pericardial effusion. No evidence of acute central, main, lobar or proximal segmental pulmonary embolism.   There is consolidative opacity in the left upper lobe compatible with pneumonia. Small right and minimal trace left pleural effusion and bilateral atelectasis or mild scarring. Postsurgical change with suture material in right lower lobe. No pneumothorax.   Limited evaluation of the upper abdomen. Hepatic steatosis.   Stable 2 cm cyst in the posterior right kidney. A 9 mm posterior exophytic lesion in right kidney demonstrates internal complexity, however is decreased in size in comparison to the 8/28/2023 CT examination and there is no evidence of metabolic activity on the 3/13/2024 PET-CT examination, the finding may correspond to a hemorrhagic cyst.   Multilevel degenerative change of the thoracic spine.       No evidence of acute pulmonary embolism.   Consolidative opacity in the left upper lobe compatible with pneumonia. Short-term progress images recommended after treatment to assure resolution and exclude other underlying obscured pathology.   Small right pleural effusion and postsurgical change of partial resection in right lower lobe and basilar atelectasis and/or scarring.   MACRO: None   Signed by: Rod Cramer 5/10/2024 7:08 PM Dictation workstation:   [id number]    XR chest 1 view    Result Date:  5/10/2024  Interpreted By:  Shamir Chaney, STUDY: XR CHEST 1 VIEW;  5/10/2024 4:41 pm   INDICATION: Signs/Symptoms:SOB, CP.   COMPARISON: 08/07/2023   ACCESSION NUMBER(S): [id number]   ORDERING CLINICIAN: FELISA MELGAR   FINDINGS: CARDIOMEDIASTINAL SILHOUETTE AND VASCULATURE:   Cardiac size:  Within normal limits. Aortic shadow:  Mild dilatation tortuosity   Mediastinal contours: Within normal limits.   Pulmonary vasculature:  The central vasculature is unremarkable   LUNGS: Band of opacity is present within the left mid lung right lower lung laterally and at the left base most consistent with atelectasis or focal infiltrates. There is also probably atelectasis or fibrosis at the right base with blunted costophrenic angle similar to the prior exam.   ABDOMEN AND OTHER FINDINGS: No remarkable upper abdominal findings.   BONES: No acute osseous changes.       1.  Atelectasis or focal infiltrate since the prior exam.   Signed by: Shamir Chaney 5/10/2024 4:45 PM Dictation workstation:   [id number]        Assessment/Plan   Sepsis secondary to pneumonia  - No recent hospitalization  - Ceftriaxone and Azithromycin  - urine strep and Legionella  - ID consult  - Robitussin and tessalon perles for cough    COPD exacerbation  - scheduled duonebs  - Solumedrol 40mg Q8hr  - oxygen as needed, does not use at home    CARLOS  - CPAP ordered, uses 2 LPM oxygen   - Declines use tonight due to left sided pain    L chest pleuritic pain  - Likely secondary to PNA  - morphine    Hx of SCC  - hx of partial lobectomy, right   - not on CT       I spent 60 minutes in the professional and overall care of this patient.      Arlene Matthew MD

## 2024-05-11 NOTE — PROGRESS NOTES
Matt Erwin is a 68 y.o. male on day 1 of admission presenting with HCAP (healthcare-associated pneumonia).      Subjective    patient feels much better on 3 L of nasal cannula oxygen white count is improving, blood pressure stable will discontinue IV fluids has a dry cough, pain in his chest feels much better as well       Objective     Last Recorded Vitals  BP (!) 151/99   Pulse 92   Temp 37.3 °C (99.1 °F)   Resp 20   Wt 109 kg (240 lb)   SpO2 97%   Intake/Output last 3 Shifts:    Intake/Output Summary (Last 24 hours) at 5/11/2024 1129  Last data filed at 5/11/2024 0939  Gross per 24 hour   Intake 2763.33 ml   Output --   Net 2763.33 ml       Admission Weight  Weight: 109 kg (240 lb) (05/10/24 1522)    Daily Weight  05/10/24 : 109 kg (240 lb)    Image Results  CT angio chest for pulmonary embolism  Narrative: Interpreted By:  Rod Cramer,   STUDY:  CT ANGIO CHEST FOR PULMONARY EMBOLISM;  5/10/2024 6:28 pm      INDICATION:  Signs/Symptoms:sob and pain.      COMPARISON:  2/27/2024, 4/21/2023, 3/13/2024      ACCESSION NUMBER(S):  [id number]      ORDERING CLINICIAN:  FLAKO HILL      TECHNIQUE:  Contiguous axial images of the chest were obtained after the  intravenous administration of  contrast. Coronal and sagittal  reformatted images were obtained from the axial images. MIPS of the  chest were also performed and reviewed.      FINDINGS:  No axillary, mediastinal, hilar lymphadenopathy.      The heart is normal in size. Coronary artery atherosclerotic  calcifications. No significant pericardial effusion. No evidence of  acute central, main, lobar or proximal segmental pulmonary embolism.      There is consolidative opacity in the left upper lobe compatible with  pneumonia. Small right and minimal trace left pleural effusion and  bilateral atelectasis or mild scarring. Postsurgical change with  suture material in right lower lobe. No pneumothorax.      Limited evaluation of the upper abdomen.  Hepatic  steatosis.      Stable 2 cm cyst in the posterior right kidney. A 9 mm posterior  exophytic lesion in right kidney demonstrates internal complexity,  however is decreased in size in comparison to the 8/28/2023 CT  examination and there is no evidence of metabolic activity on the  3/13/2024 PET-CT examination, the finding may correspond to a  hemorrhagic cyst.      Multilevel degenerative change of the thoracic spine.      Impression: No evidence of acute pulmonary embolism.      Consolidative opacity in the left upper lobe compatible with  pneumonia. Short-term progress images recommended after treatment to  assure resolution and exclude other underlying obscured pathology.      Small right pleural effusion and postsurgical change of partial  resection in right lower lobe and basilar atelectasis and/or scarring.      MACRO:  None      Signed by: Rod Cramer 5/10/2024 7:08 PM  Dictation workstation:   [id number]  XR chest 1 view  Narrative: Interpreted By:  Shamir Chaney,   STUDY:  XR CHEST 1 VIEW;  5/10/2024 4:41 pm      INDICATION:  Signs/Symptoms:SOB, CP.      COMPARISON:  08/07/2023      ACCESSION NUMBER(S):  [id number]      ORDERING CLINICIAN:  FELISA MELGAR      FINDINGS:  CARDIOMEDIASTINAL SILHOUETTE AND VASCULATURE:      Cardiac size:  Within normal limits.  Aortic shadow:  Mild dilatation tortuosity      Mediastinal contours: Within normal limits.      Pulmonary vasculature:  The central vasculature is unremarkable      LUNGS:  Band of opacity is present within the left mid lung right lower lung  laterally and at the left base most consistent with atelectasis or  focal infiltrates. There is also probably atelectasis or fibrosis at  the right base with blunted costophrenic angle similar to the prior  exam.      ABDOMEN AND OTHER FINDINGS:  No remarkable upper abdominal findings.      BONES:  No acute osseous changes.      Impression: 1.  Atelectasis or focal infiltrate since the prior exam.      Signed  by: Shamir Chaney 5/10/2024 4:45 PM  Dictation workstation:   [id number]      Physical Exam  Vitals reviewed.   Constitutional:       Appearance: Normal appearance.   HENT:      Head: Normocephalic.      Nose: Nose normal.      Mouth/Throat:      Mouth: Mucous membranes are dry.   Pulmonary:      Comments: ALVINO rhonchi  Abdominal:      General: Abdomen is flat.      Palpations: Abdomen is soft.   Neurological:      Mental Status: He is alert.         Relevant Results  azithromycin, 500 mg, intravenous, q24h  cefTRIAXone, 2 g, intravenous, q24h  enoxaparin, 40 mg, subcutaneous, q24h  ipratropium-albuteroL, 3 mL, nebulization, 4x daily  methylPREDNISolone sodium succinate (PF), 40 mg, intravenous, q8h  oxygen, , inhalation, Continuous - Inhalation  polyethylene glycol, 17 g, oral, Daily      Results for orders placed or performed during the hospital encounter of 05/10/24 (from the past 24 hour(s))   CBC and Auto Differential   Result Value Ref Range    WBC 22.8 (H) 4.4 - 11.3 x10*3/uL    nRBC 0.0 0.0 - 0.0 /100 WBCs    RBC 4.53 4.50 - 5.90 x10*6/uL    Hemoglobin 13.7 13.5 - 17.5 g/dL    Hematocrit 42.1 41.0 - 52.0 %    MCV 93 80 - 100 fL    MCH 30.2 26.0 - 34.0 pg    MCHC 32.5 32.0 - 36.0 g/dL    RDW 14.0 11.5 - 14.5 %    Platelets 218 150 - 450 x10*3/uL    Neutrophils % 85.3 40.0 - 80.0 %    Immature Granulocytes %, Automated 0.7 0.0 - 0.9 %    Lymphocytes % 7.4 13.0 - 44.0 %    Monocytes % 6.3 2.0 - 10.0 %    Eosinophils % 0.1 0.0 - 6.0 %    Basophils % 0.2 0.0 - 2.0 %    Neutrophils Absolute 19.45 (H) 1.20 - 7.70 x10*3/uL    Immature Granulocytes Absolute, Automated 0.15 0.00 - 0.70 x10*3/uL    Lymphocytes Absolute 1.68 1.20 - 4.80 x10*3/uL    Monocytes Absolute 1.43 (H) 0.10 - 1.00 x10*3/uL    Eosinophils Absolute 0.02 0.00 - 0.70 x10*3/uL    Basophils Absolute 0.05 0.00 - 0.10 x10*3/uL   Magnesium   Result Value Ref Range    Magnesium 2.10 1.60 - 2.40 mg/dL   Comprehensive metabolic panel   Result Value Ref Range     Glucose 106 (H) 74 - 99 mg/dL    Sodium 137 136 - 145 mmol/L    Potassium 4.0 3.5 - 5.3 mmol/L    Chloride 99 98 - 107 mmol/L    Bicarbonate 27 21 - 32 mmol/L    Anion Gap 15 10 - 20 mmol/L    Urea Nitrogen 23 6 - 23 mg/dL    Creatinine 1.25 0.50 - 1.30 mg/dL    eGFR 62 >60 mL/min/1.73m*2    Calcium 9.0 8.6 - 10.3 mg/dL    Albumin 4.2 3.4 - 5.0 g/dL    Alkaline Phosphatase 81 33 - 136 U/L    Total Protein 7.5 6.4 - 8.2 g/dL    AST 17 9 - 39 U/L    Bilirubin, Total 0.7 0.0 - 1.2 mg/dL    ALT 24 10 - 52 U/L   Troponin I, High Sensitivity   Result Value Ref Range    Troponin I, High Sensitivity 8 0 - 20 ng/L   D-Dimer, VTE Exclusion   Result Value Ref Range    D-Dimer, Quantitative VTE Exclusion 758 (H) <=500 ng/mL FEU   B-Type Natriuretic Peptide   Result Value Ref Range    BNP 48 0 - 99 pg/mL   Blood Culture    Specimen: Peripheral Venipuncture; Blood culture   Result Value Ref Range    Blood Culture Loaded on Instrument - Culture in progress    Blood Culture    Specimen: Peripheral Venipuncture; Blood culture   Result Value Ref Range    Blood Culture Loaded on Instrument - Culture in progress    Troponin I, High Sensitivity   Result Value Ref Range    Troponin I, High Sensitivity 5 0 - 20 ng/L   Lactate   Result Value Ref Range    Lactate 1.9 0.4 - 2.0 mmol/L   Urine Tube   Result Value Ref Range    Extra Tube Hold for add-ons.    CBC and Auto Differential   Result Value Ref Range    WBC 15.9 (H) 4.4 - 11.3 x10*3/uL    nRBC 0.0 0.0 - 0.0 /100 WBCs    RBC 4.11 (L) 4.50 - 5.90 x10*6/uL    Hemoglobin 12.6 (L) 13.5 - 17.5 g/dL    Hematocrit 39.0 (L) 41.0 - 52.0 %    MCV 95 80 - 100 fL    MCH 30.7 26.0 - 34.0 pg    MCHC 32.3 32.0 - 36.0 g/dL    RDW 14.1 11.5 - 14.5 %    Platelets 192 150 - 450 x10*3/uL    Neutrophils % 93.9 40.0 - 80.0 %    Immature Granulocytes %, Automated 0.9 0.0 - 0.9 %    Lymphocytes % 3.1 13.0 - 44.0 %    Monocytes % 2.0 2.0 - 10.0 %    Eosinophils % 0.0 0.0 - 6.0 %    Basophils % 0.1 0.0 - 2.0  %    Neutrophils Absolute 14.91 (H) 1.20 - 7.70 x10*3/uL    Immature Granulocytes Absolute, Automated 0.14 0.00 - 0.70 x10*3/uL    Lymphocytes Absolute 0.49 (L) 1.20 - 4.80 x10*3/uL    Monocytes Absolute 0.32 0.10 - 1.00 x10*3/uL    Eosinophils Absolute 0.00 0.00 - 0.70 x10*3/uL    Basophils Absolute 0.01 0.00 - 0.10 x10*3/uL   Basic metabolic panel   Result Value Ref Range    Glucose 151 (H) 74 - 99 mg/dL    Sodium 138 136 - 145 mmol/L    Potassium 4.0 3.5 - 5.3 mmol/L    Chloride 105 98 - 107 mmol/L    Bicarbonate 24 21 - 32 mmol/L    Anion Gap 13 10 - 20 mmol/L    Urea Nitrogen 18 6 - 23 mg/dL    Creatinine 1.05 0.50 - 1.30 mg/dL    eGFR 77 >60 mL/min/1.73m*2    Calcium 8.3 (L) 8.6 - 10.3 mg/dL   Magnesium   Result Value Ref Range    Magnesium 2.10 1.60 - 2.40 mg/dL   POCT GLUCOSE   Result Value Ref Range    POCT Glucose 142 (H) 74 - 99 mg/dL   POCT GLUCOSE   Result Value Ref Range    POCT Glucose 170 (H) 74 - 99 mg/dL     Imaging:   Ct angio chest:  IMPRESSION:  No evidence of acute pulmonary embolism.      Consolidative opacity in the left upper lobe compatible with  pneumonia. Short-term progress images recommended after treatment to  assure resolution and exclude other underlying obscured pathology.      Small right pleural effusion and postsurgical change of partial  resection in right lower lobe and basilar atelectasis and/or scarring.         Assessment/Plan      ALVINO PNA  with acute hypoxic respiratory failure  -continue azithromycin and iv ceftriaxone  - need follow-up imaging to ensure resolution, on 3 L of nasal cannula oxygen will wean as tolerated.  -wbc improved    2.  Squamous cell carcinoma status post partial lobectomy on the right  - stable    3. COPD  - do not feel that the patient is currently in exacerbation will remain on IV steroids today discharge tomorrow continue DuoNebs.    Dvt prop:solomon Rivera MD

## 2024-05-11 NOTE — PROGRESS NOTES
"Pharmacy Medication History Review    Per patient.    Norma Erwin \"Matt\" is a 68 y.o. male admitted for HCAP (healthcare-associated pneumonia). Pharmacy reviewed the patient's [id number] medications and allergies for accuracy.    The list below reflectives the updated PTA list. Please review each medication in order reconciliation for additional clarification and justification.       The list below reflectives the updated allergy list. Please review each documented allergy for additional clarification and justification.  Allergies  Reviewed by Bayron Cleveland RN on 5/10/2024   No Known Allergies         Below are additional concerns with the patient's PTA list.  Prior to Admission Medications   Prescriptions Last Dose Informant   Trelegy Ellipta 200-62.5-25 mcg blister with device 5/10/2024    Sig: Inhale 1 puff once daily.   Ventolin HFA 90 mcg/actuation inhaler     Sig: Inhale 2 puffs every 4 hours if needed for wheezing or shortness of breath.   ibuprofen 200 mg tablet 5/10/2024    Sig: Take 2-4 tablets (400-800 mg) by mouth every 6 hours if needed for mild pain (1 - 3) (patient states that he takes almost daily).   ipratropium-albuteroL (Duo-Neb) 0.5-2.5 mg/3 mL nebulizer solution     Sig: Inhale 3 mL every 4 hours if needed for wheezing or shortness of breath.      Facility-Administered Medications: None          Pushpa Covarrubias    "

## 2024-05-12 LAB
ANION GAP SERPL CALC-SCNC: 14 MMOL/L (ref 10–20)
BASOPHILS # BLD AUTO: 0.03 X10*3/UL (ref 0–0.1)
BASOPHILS NFR BLD AUTO: 0.2 %
BUN SERPL-MCNC: 19 MG/DL (ref 6–23)
CALCIUM SERPL-MCNC: 8.2 MG/DL (ref 8.6–10.3)
CHLORIDE SERPL-SCNC: 108 MMOL/L (ref 98–107)
CO2 SERPL-SCNC: 22 MMOL/L (ref 21–32)
CREAT SERPL-MCNC: 0.87 MG/DL (ref 0.5–1.3)
EGFRCR SERPLBLD CKD-EPI 2021: >90 ML/MIN/1.73M*2
EOSINOPHIL # BLD AUTO: 0 X10*3/UL (ref 0–0.7)
EOSINOPHIL NFR BLD AUTO: 0 %
ERYTHROCYTE [DISTWIDTH] IN BLOOD BY AUTOMATED COUNT: 13.8 % (ref 11.5–14.5)
GLUCOSE BLD MANUAL STRIP-MCNC: 231 MG/DL (ref 74–99)
GLUCOSE SERPL-MCNC: 139 MG/DL (ref 74–99)
HCT VFR BLD AUTO: 40.6 % (ref 41–52)
HGB BLD-MCNC: 13.1 G/DL (ref 13.5–17.5)
IMM GRANULOCYTES # BLD AUTO: 0.23 X10*3/UL (ref 0–0.7)
IMM GRANULOCYTES NFR BLD AUTO: 1.4 % (ref 0–0.9)
LEGIONELLA AG UR QL: NEGATIVE
LYMPHOCYTES # BLD AUTO: 0.85 X10*3/UL (ref 1.2–4.8)
LYMPHOCYTES NFR BLD AUTO: 5.2 %
MCH RBC QN AUTO: 30.6 PG (ref 26–34)
MCHC RBC AUTO-ENTMCNC: 32.3 G/DL (ref 32–36)
MCV RBC AUTO: 95 FL (ref 80–100)
MONOCYTES # BLD AUTO: 0.59 X10*3/UL (ref 0.1–1)
MONOCYTES NFR BLD AUTO: 3.6 %
NEUTROPHILS # BLD AUTO: 14.69 X10*3/UL (ref 1.2–7.7)
NEUTROPHILS NFR BLD AUTO: 89.6 %
NRBC BLD-RTO: 0 /100 WBCS (ref 0–0)
PLATELET # BLD AUTO: 232 X10*3/UL (ref 150–450)
POTASSIUM SERPL-SCNC: 3.9 MMOL/L (ref 3.5–5.3)
RBC # BLD AUTO: 4.28 X10*6/UL (ref 4.5–5.9)
S PNEUM AG UR QL: NEGATIVE
SODIUM SERPL-SCNC: 140 MMOL/L (ref 136–145)
WBC # BLD AUTO: 16.4 X10*3/UL (ref 4.4–11.3)

## 2024-05-12 PROCEDURE — 85025 COMPLETE CBC W/AUTO DIFF WBC: CPT | Performed by: INTERNAL MEDICINE

## 2024-05-12 PROCEDURE — 80048 BASIC METABOLIC PNL TOTAL CA: CPT | Performed by: INTERNAL MEDICINE

## 2024-05-12 PROCEDURE — 94640 AIRWAY INHALATION TREATMENT: CPT

## 2024-05-12 PROCEDURE — 36415 COLL VENOUS BLD VENIPUNCTURE: CPT | Performed by: INTERNAL MEDICINE

## 2024-05-12 PROCEDURE — 99233 SBSQ HOSP IP/OBS HIGH 50: CPT | Performed by: INTERNAL MEDICINE

## 2024-05-12 PROCEDURE — 2500000002 HC RX 250 W HCPCS SELF ADMINISTERED DRUGS (ALT 637 FOR MEDICARE OP, ALT 636 FOR OP/ED): Performed by: HOSPITALIST

## 2024-05-12 PROCEDURE — 1100000001 HC PRIVATE ROOM DAILY

## 2024-05-12 PROCEDURE — 2500000005 HC RX 250 GENERAL PHARMACY W/O HCPCS: Performed by: HOSPITALIST

## 2024-05-12 PROCEDURE — 2500000004 HC RX 250 GENERAL PHARMACY W/ HCPCS (ALT 636 FOR OP/ED): Performed by: HOSPITALIST

## 2024-05-12 PROCEDURE — 82947 ASSAY GLUCOSE BLOOD QUANT: CPT

## 2024-05-12 RX ORDER — PANTOPRAZOLE SODIUM 40 MG/1
40 TABLET, DELAYED RELEASE ORAL
Status: DISCONTINUED | OUTPATIENT
Start: 2024-05-13 | End: 2024-05-13 | Stop reason: HOSPADM

## 2024-05-12 RX ORDER — ALUMINUM HYDROXIDE, MAGNESIUM HYDROXIDE, AND SIMETHICONE 1200; 120; 1200 MG/30ML; MG/30ML; MG/30ML
30 SUSPENSION ORAL 4 TIMES DAILY PRN
Status: DISCONTINUED | OUTPATIENT
Start: 2024-05-12 | End: 2024-05-13 | Stop reason: HOSPADM

## 2024-05-12 RX ADMIN — IPRATROPIUM BROMIDE AND ALBUTEROL SULFATE 3 ML: 2.5; .5 SOLUTION RESPIRATORY (INHALATION) at 15:23

## 2024-05-12 RX ADMIN — IPRATROPIUM BROMIDE AND ALBUTEROL SULFATE 3 ML: 2.5; .5 SOLUTION RESPIRATORY (INHALATION) at 20:40

## 2024-05-12 RX ADMIN — IPRATROPIUM BROMIDE AND ALBUTEROL SULFATE 3 ML: 2.5; .5 SOLUTION RESPIRATORY (INHALATION) at 07:44

## 2024-05-12 RX ADMIN — METHYLPREDNISOLONE SODIUM SUCCINATE 40 MG: 125 INJECTION, POWDER, FOR SOLUTION INTRAMUSCULAR; INTRAVENOUS at 09:48

## 2024-05-12 RX ADMIN — CEFTRIAXONE 2 G: 2 INJECTION, POWDER, FOR SOLUTION INTRAMUSCULAR; INTRAVENOUS at 18:24

## 2024-05-12 RX ADMIN — AZITHROMYCIN MONOHYDRATE 500 MG: 500 INJECTION, POWDER, LYOPHILIZED, FOR SOLUTION INTRAVENOUS at 16:56

## 2024-05-12 RX ADMIN — Medication 2 L/MIN: at 20:00

## 2024-05-12 RX ADMIN — Medication 2 L/MIN: at 15:23

## 2024-05-12 RX ADMIN — ENOXAPARIN SODIUM 40 MG: 40 INJECTION SUBCUTANEOUS at 09:48

## 2024-05-12 RX ADMIN — Medication 2 L/MIN: at 07:44

## 2024-05-12 RX ADMIN — Medication 2 L/MIN: at 20:40

## 2024-05-12 ASSESSMENT — COGNITIVE AND FUNCTIONAL STATUS - GENERAL
DAILY ACTIVITIY SCORE: 24
MOBILITY SCORE: 24

## 2024-05-12 ASSESSMENT — PAIN - FUNCTIONAL ASSESSMENT: PAIN_FUNCTIONAL_ASSESSMENT: 0-10

## 2024-05-12 ASSESSMENT — PAIN SCALES - GENERAL: PAINLEVEL_OUTOF10: 0 - NO PAIN

## 2024-05-12 NOTE — PROGRESS NOTES
Matt Erwin is a 68 y.o. male on day 2 of admission presenting with HCAP (healthcare-associated pneumonia).      Subjective   Patient down to 2 L nasal cannula oxygen he is doing very well afebrile, white count 16,000 discontinue steroids.       Objective     Last Recorded Vitals  /75 (BP Location: Right arm, Patient Position: Sitting)   Pulse 97   Temp 36.5 °C (97.7 °F) (Temporal)   Resp 24   Wt 109 kg (240 lb)   SpO2 95%   Intake/Output last 3 Shifts:    Intake/Output Summary (Last 24 hours) at 5/12/2024 1108  Last data filed at 5/11/2024 1812  Gross per 24 hour   Intake 300 ml   Output --   Net 300 ml       Admission Weight  Weight: 109 kg (240 lb) (05/10/24 1522)    Daily Weight  05/10/24 : 109 kg (240 lb)    Image Results  CT angio chest for pulmonary embolism  Narrative: Interpreted By:  Rod Cramer,   STUDY:  CT ANGIO CHEST FOR PULMONARY EMBOLISM;  5/10/2024 6:28 pm      INDICATION:  Signs/Symptoms:sob and pain.      COMPARISON:  2/27/2024, 4/21/2023, 3/13/2024      ACCESSION NUMBER(S):  [id number]      ORDERING CLINICIAN:  FLAKO HILL      TECHNIQUE:  Contiguous axial images of the chest were obtained after the  intravenous administration of  contrast. Coronal and sagittal  reformatted images were obtained from the axial images. MIPS of the  chest were also performed and reviewed.      FINDINGS:  No axillary, mediastinal, hilar lymphadenopathy.      The heart is normal in size. Coronary artery atherosclerotic  calcifications. No significant pericardial effusion. No evidence of  acute central, main, lobar or proximal segmental pulmonary embolism.      There is consolidative opacity in the left upper lobe compatible with  pneumonia. Small right and minimal trace left pleural effusion and  bilateral atelectasis or mild scarring. Postsurgical change with  suture material in right lower lobe. No pneumothorax.      Limited evaluation of the upper abdomen.  Hepatic steatosis.      Stable 2 cm cyst  in the posterior right kidney. A 9 mm posterior  exophytic lesion in right kidney demonstrates internal complexity,  however is decreased in size in comparison to the 8/28/2023 CT  examination and there is no evidence of metabolic activity on the  3/13/2024 PET-CT examination, the finding may correspond to a  hemorrhagic cyst.      Multilevel degenerative change of the thoracic spine.      Impression: No evidence of acute pulmonary embolism.      Consolidative opacity in the left upper lobe compatible with  pneumonia. Short-term progress images recommended after treatment to  assure resolution and exclude other underlying obscured pathology.      Small right pleural effusion and postsurgical change of partial  resection in right lower lobe and basilar atelectasis and/or scarring.      MACRO:  None      Signed by: Rod Cramer 5/10/2024 7:08 PM  Dictation workstation:   [id number]  XR chest 1 view  Narrative: Interpreted By:  Shamir Chaney,   STUDY:  XR CHEST 1 VIEW;  5/10/2024 4:41 pm      INDICATION:  Signs/Symptoms:SOB, CP.      COMPARISON:  08/07/2023      ACCESSION NUMBER(S):  [id number]      ORDERING CLINICIAN:  FELISA MELGAR      FINDINGS:  CARDIOMEDIASTINAL SILHOUETTE AND VASCULATURE:      Cardiac size:  Within normal limits.  Aortic shadow:  Mild dilatation tortuosity      Mediastinal contours: Within normal limits.      Pulmonary vasculature:  The central vasculature is unremarkable      LUNGS:  Band of opacity is present within the left mid lung right lower lung  laterally and at the left base most consistent with atelectasis or  focal infiltrates. There is also probably atelectasis or fibrosis at  the right base with blunted costophrenic angle similar to the prior  exam.      ABDOMEN AND OTHER FINDINGS:  No remarkable upper abdominal findings.      BONES:  No acute osseous changes.      Impression: 1.  Atelectasis or focal infiltrate since the prior exam.      Signed by: Shamir Chaney 5/10/2024 4:45  PM  Dictation workstation:   [id number]      Physical Exam  Vitals reviewed.   Constitutional:       Appearance: Normal appearance.   HENT:      Head: Normocephalic.      Nose: Nose normal.      Mouth/Throat:      Mouth: Mucous membranes are dry.   Cardiovascular:      Rate and Rhythm: Normal rate and regular rhythm.   Pulmonary:      Comments: ALVINO rhonchi  Abdominal:      General: Abdomen is flat.      Palpations: Abdomen is soft.   Neurological:      General: No focal deficit present.      Mental Status: He is alert.         Relevant Results  azithromycin, 500 mg, intravenous, q24h  cefTRIAXone, 2 g, intravenous, q24h  enoxaparin, 40 mg, subcutaneous, q24h  ipratropium-albuteroL, 3 mL, nebulization, 4x daily  oxygen, , inhalation, Continuous - Inhalation  [START ON 5/13/2024] pantoprazole, 40 mg, oral, Daily before breakfast  polyethylene glycol, 17 g, oral, Daily      Results for orders placed or performed during the hospital encounter of 05/10/24 (from the past 24 hour(s))   CBC and Auto Differential   Result Value Ref Range    WBC 16.4 (H) 4.4 - 11.3 x10*3/uL    nRBC 0.0 0.0 - 0.0 /100 WBCs    RBC 4.28 (L) 4.50 - 5.90 x10*6/uL    Hemoglobin 13.1 (L) 13.5 - 17.5 g/dL    Hematocrit 40.6 (L) 41.0 - 52.0 %    MCV 95 80 - 100 fL    MCH 30.6 26.0 - 34.0 pg    MCHC 32.3 32.0 - 36.0 g/dL    RDW 13.8 11.5 - 14.5 %    Platelets 232 150 - 450 x10*3/uL    Neutrophils % 89.6 40.0 - 80.0 %    Immature Granulocytes %, Automated 1.4 (H) 0.0 - 0.9 %    Lymphocytes % 5.2 13.0 - 44.0 %    Monocytes % 3.6 2.0 - 10.0 %    Eosinophils % 0.0 0.0 - 6.0 %    Basophils % 0.2 0.0 - 2.0 %    Neutrophils Absolute 14.69 (H) 1.20 - 7.70 x10*3/uL    Immature Granulocytes Absolute, Automated 0.23 0.00 - 0.70 x10*3/uL    Lymphocytes Absolute 0.85 (L) 1.20 - 4.80 x10*3/uL    Monocytes Absolute 0.59 0.10 - 1.00 x10*3/uL    Eosinophils Absolute 0.00 0.00 - 0.70 x10*3/uL    Basophils Absolute 0.03 0.00 - 0.10 x10*3/uL   Basic metabolic panel    Result Value Ref Range    Glucose 139 (H) 74 - 99 mg/dL    Sodium 140 136 - 145 mmol/L    Potassium 3.9 3.5 - 5.3 mmol/L    Chloride 108 (H) 98 - 107 mmol/L    Bicarbonate 22 21 - 32 mmol/L    Anion Gap 14 10 - 20 mmol/L    Urea Nitrogen 19 6 - 23 mg/dL    Creatinine 0.87 0.50 - 1.30 mg/dL    eGFR >90 >60 mL/min/1.73m*2    Calcium 8.2 (L) 8.6 - 10.3 mg/dL     Imaging:   Ct angio chest:  IMPRESSION:  No evidence of acute pulmonary embolism.      Consolidative opacity in the left upper lobe compatible with  pneumonia. Short-term progress images recommended after treatment to  assure resolution and exclude other underlying obscured pathology.      Small right pleural effusion and postsurgical change of partial  resection in right lower lobe and basilar atelectasis and/or scarring.         Assessment/Plan      ALVINO PNA  with acute hypoxic respiratory failure  -continue azithromycin and iv ceftriaxone  - need follow-up imaging to ensure resolution, on 2 L of nasal cannula oxygen will wean as tolerated.  -wbc improved    2.  Squamous cell carcinoma status post partial lobectomy on the right  - stable    3. COPD  -not in excerebration dc iv steroids    Dvt prop:lovenox        Aileen Rivera MD

## 2024-05-13 ENCOUNTER — HOSPITAL ENCOUNTER (OUTPATIENT)
Dept: RADIOLOGY | Facility: CLINIC | Age: 69
End: 2024-05-13
Payer: MEDICARE

## 2024-05-13 VITALS
HEIGHT: 71 IN | OXYGEN SATURATION: 96 % | WEIGHT: 240 LBS | DIASTOLIC BLOOD PRESSURE: 89 MMHG | RESPIRATION RATE: 19 BRPM | BODY MASS INDEX: 33.6 KG/M2 | SYSTOLIC BLOOD PRESSURE: 153 MMHG | TEMPERATURE: 98.2 F | HEART RATE: 85 BPM

## 2024-05-13 LAB
ANION GAP SERPL CALC-SCNC: 15 MMOL/L (ref 10–20)
BASOPHILS # BLD AUTO: 0.06 X10*3/UL (ref 0–0.1)
BASOPHILS NFR BLD AUTO: 0.4 %
BUN SERPL-MCNC: 23 MG/DL (ref 6–23)
CALCIUM SERPL-MCNC: 8.5 MG/DL (ref 8.6–10.3)
CHLORIDE SERPL-SCNC: 107 MMOL/L (ref 98–107)
CO2 SERPL-SCNC: 23 MMOL/L (ref 21–32)
CREAT SERPL-MCNC: 0.94 MG/DL (ref 0.5–1.3)
EGFRCR SERPLBLD CKD-EPI 2021: 88 ML/MIN/1.73M*2
EOSINOPHIL # BLD AUTO: 0 X10*3/UL (ref 0–0.7)
EOSINOPHIL NFR BLD AUTO: 0 %
ERYTHROCYTE [DISTWIDTH] IN BLOOD BY AUTOMATED COUNT: 14 % (ref 11.5–14.5)
GLUCOSE SERPL-MCNC: 124 MG/DL (ref 74–99)
HCT VFR BLD AUTO: 40.6 % (ref 41–52)
HGB BLD-MCNC: 13 G/DL (ref 13.5–17.5)
IMM GRANULOCYTES # BLD AUTO: 0.27 X10*3/UL (ref 0–0.7)
IMM GRANULOCYTES NFR BLD AUTO: 1.8 % (ref 0–0.9)
LYMPHOCYTES # BLD AUTO: 1.24 X10*3/UL (ref 1.2–4.8)
LYMPHOCYTES NFR BLD AUTO: 8.2 %
MCH RBC QN AUTO: 30.2 PG (ref 26–34)
MCHC RBC AUTO-ENTMCNC: 32 G/DL (ref 32–36)
MCV RBC AUTO: 94 FL (ref 80–100)
MONOCYTES # BLD AUTO: 1.01 X10*3/UL (ref 0.1–1)
MONOCYTES NFR BLD AUTO: 6.7 %
NEUTROPHILS # BLD AUTO: 12.56 X10*3/UL (ref 1.2–7.7)
NEUTROPHILS NFR BLD AUTO: 82.9 %
NRBC BLD-RTO: 0 /100 WBCS (ref 0–0)
PLATELET # BLD AUTO: 250 X10*3/UL (ref 150–450)
POTASSIUM SERPL-SCNC: 3.9 MMOL/L (ref 3.5–5.3)
RBC # BLD AUTO: 4.3 X10*6/UL (ref 4.5–5.9)
SODIUM SERPL-SCNC: 141 MMOL/L (ref 136–145)
WBC # BLD AUTO: 15.1 X10*3/UL (ref 4.4–11.3)

## 2024-05-13 PROCEDURE — 2500000005 HC RX 250 GENERAL PHARMACY W/O HCPCS: Performed by: HOSPITALIST

## 2024-05-13 PROCEDURE — 2500000001 HC RX 250 WO HCPCS SELF ADMINISTERED DRUGS (ALT 637 FOR MEDICARE OP): Performed by: INTERNAL MEDICINE

## 2024-05-13 PROCEDURE — 2500000002 HC RX 250 W HCPCS SELF ADMINISTERED DRUGS (ALT 637 FOR MEDICARE OP, ALT 636 FOR OP/ED): Performed by: HOSPITALIST

## 2024-05-13 PROCEDURE — 94640 AIRWAY INHALATION TREATMENT: CPT

## 2024-05-13 PROCEDURE — 99239 HOSP IP/OBS DSCHRG MGMT >30: CPT | Performed by: INTERNAL MEDICINE

## 2024-05-13 PROCEDURE — 82374 ASSAY BLOOD CARBON DIOXIDE: CPT | Performed by: INTERNAL MEDICINE

## 2024-05-13 PROCEDURE — 85025 COMPLETE CBC W/AUTO DIFF WBC: CPT | Performed by: INTERNAL MEDICINE

## 2024-05-13 PROCEDURE — 36415 COLL VENOUS BLD VENIPUNCTURE: CPT | Performed by: INTERNAL MEDICINE

## 2024-05-13 PROCEDURE — 94664 DEMO&/EVAL PT USE INHALER: CPT

## 2024-05-13 RX ORDER — LEVOFLOXACIN 500 MG/1
500 TABLET, FILM COATED ORAL DAILY
Qty: 2 TABLET | Refills: 0 | Status: SHIPPED | OUTPATIENT
Start: 2024-05-13 | End: 2024-05-15

## 2024-05-13 RX ADMIN — PANTOPRAZOLE SODIUM 40 MG: 40 TABLET, DELAYED RELEASE ORAL at 05:41

## 2024-05-13 RX ADMIN — IPRATROPIUM BROMIDE AND ALBUTEROL SULFATE 3 ML: 2.5; .5 SOLUTION RESPIRATORY (INHALATION) at 07:16

## 2024-05-13 RX ADMIN — IPRATROPIUM BROMIDE AND ALBUTEROL SULFATE 3 ML: 2.5; .5 SOLUTION RESPIRATORY (INHALATION) at 11:08

## 2024-05-13 ASSESSMENT — COGNITIVE AND FUNCTIONAL STATUS - GENERAL
MOBILITY SCORE: 24
DAILY ACTIVITIY SCORE: 24

## 2024-05-13 ASSESSMENT — PAIN SCALES - GENERAL: PAINLEVEL_OUTOF10: 0 - NO PAIN

## 2024-05-13 ASSESSMENT — ACTIVITIES OF DAILY LIVING (ADL): LACK_OF_TRANSPORTATION: NO

## 2024-05-13 NOTE — DISCHARGE SUMMARY
Discharge Diagnosis  ALVINO pna   Acute hypoxic respiratory failure, resolved    Issues Requiring Follow-Up  pcp    Discharge Meds     Your medication list        START taking these medications        Instructions Last Dose Given Next Dose Due   levoFLOXacin 500 mg tablet  Commonly known as: Levaquin      Take 1 tablet (500 mg) by mouth once daily for 2 days.              CONTINUE taking these medications        Instructions Last Dose Given Next Dose Due   Ventolin HFA 90 mcg/actuation inhaler  Generic drug: albuterol           famotidine-Ca carb-mag hydrox -165 mg chewable tablet  Commonly known as: Pepcid Complete           ibuprofen 200 mg tablet           ipratropium-albuteroL 0.5-2.5 mg/3 mL nebulizer solution  Commonly known as: Duo-Neb           Trelegy Ellipta 200-62.5-25 mcg blister with device  Generic drug: fluticasone-umeclidin-vilanter                     Where to Get Your Medications        These medications were sent to [facility] [address] [place] [address]  [id number] [place] [address]      Phone: [phone]   levoFLOXacin 500 mg tablet         Test Results Pending At Discharge  Pending Labs       Order Current Status    Blood Culture Preliminary result    Blood Culture Preliminary result            Hospital Course   Patient is a very pleasant 68-year-old gentleman presented to the hospital with complaints of shortness of breath pleuritic chest pain.  Patient was found to have left upper lobe pneumonia he does have history of squamous cell carcinoma status post partial lobectomy on the right.  She is being discharged on 2 more days of oral antibiotics to complete a 5-day course.  Patient was stable at the time of discharge    Time spent more than 30 minutes on discharge      Pertinent Physical Exam At Time of Discharge  Physical Exam  Vitals reviewed.   Constitutional:       Appearance: Normal appearance.   HENT:      Head: Normocephalic.      Nose:  Nose normal.      Mouth/Throat:      Mouth: Mucous membranes are dry.      Pharynx: Oropharynx is clear.   Cardiovascular:      Rate and Rhythm: Normal rate and regular rhythm.   Pulmonary:      Effort: Pulmonary effort is normal.   Abdominal:      General: Abdomen is flat. Bowel sounds are normal.      Palpations: Abdomen is soft.   Skin:     General: Skin is warm.      Capillary Refill: Capillary refill takes less than 2 seconds.   Neurological:      General: No focal deficit present.      Mental Status: He is alert.         Outpatient Follow-Up  Future Appointments   Date Time Provider Department Center   5/16/2024  2:20 PM Nia Moran MD [id number]         Aileen Rivera MD

## 2024-05-13 NOTE — CARE PLAN
The patient's goals for the shift include    Problem: Pain - Adult  Goal: Verbalizes/displays adequate comfort level or baseline comfort level  Outcome: Progressing     Problem: Discharge Planning  Goal: Discharge to home or other facility with appropriate resources  Outcome: Progressing     Problem: Discharge Barriers  Goal: My discharge needs are met  Outcome: Progressing       The clinical goals for the shift include     Problem: Pain  Goal: My pain/discomfort is manageable  Outcome: Progressing     Problem: Safety  Goal: I will remain free of falls  Outcome: Progressing     Problem: Chronic Conditions and Co-morbidities  Goal: Patient's chronic conditions and co-morbidity symptoms are monitored and maintained or improved  Outcome: Progressing

## 2024-05-13 NOTE — CARE PLAN
The patient's goals for the shift include rest    The clinical goals for the shift include no respiratory distress

## 2024-05-13 NOTE — PROGRESS NOTES
05/13/24 1141   Discharge Planning   Living Arrangements Alone   Support Systems Family members;Spouse/significant other   Assistance Needed none   Type of Residence Private residence  (demo correct)   Home or Post Acute Services None   Patient expects to be discharged to: Home-anticpates home no needs or resources   Does the patient need discharge transport arranged? No  (vehicle in lot)   Financial Resource Strain   How hard is it for you to pay for the very basics like food, housing, medical care, and heating? Not very  (patient stated he had difficulty affording his Trelegy medication-coupon provided, he also uses [email or web address]; PCP listed last visit 6 months ago.drives to appointments)   Housing Stability   In the last 12 months, was there a time when you were not able to pay the mortgage or rent on time? N   In the last 12 months, was there a time when you did not have a steady place to sleep or slept in a shelter (including now)? N   Transportation Needs   In the past 12 months, has lack of transportation kept you from medical appointments or from getting medications? no   In the past 12 months, has lack of transportation kept you from meetings, work, or from getting things needed for daily living? No     Care Coordinator Note:  Met patient at bedside to discuss discharge planning needs  Plan: patient admitted d/t HCAP; IV abx completed oxygen weaned   Adod; today  Ana ORANTES, RN [facility]

## 2024-05-14 ENCOUNTER — PATIENT OUTREACH (OUTPATIENT)
Dept: PRIMARY CARE | Facility: CLINIC | Age: 69
End: 2024-05-14

## 2024-05-14 ENCOUNTER — OFFICE VISIT (OUTPATIENT)
Dept: PRIMARY CARE | Facility: CLINIC | Age: 69
End: 2024-05-14
Payer: MEDICARE

## 2024-05-14 VITALS
SYSTOLIC BLOOD PRESSURE: 132 MMHG | TEMPERATURE: 97.9 F | HEART RATE: 81 BPM | WEIGHT: 239 LBS | OXYGEN SATURATION: 94 % | RESPIRATION RATE: 21 BRPM | BODY MASS INDEX: 33.46 KG/M2 | DIASTOLIC BLOOD PRESSURE: 84 MMHG | HEIGHT: 71 IN

## 2024-05-14 DIAGNOSIS — G47.33 OSA (OBSTRUCTIVE SLEEP APNEA): ICD-10-CM

## 2024-05-14 DIAGNOSIS — J44.1 CHRONIC OBSTRUCTIVE PULMONARY DISEASE WITH ACUTE EXACERBATION (MULTI): Primary | ICD-10-CM

## 2024-05-14 PROCEDURE — 99213 OFFICE O/P EST LOW 20 MIN: CPT | Performed by: FAMILY MEDICINE

## 2024-05-14 PROCEDURE — 1111F DSCHRG MED/CURRENT MED MERGE: CPT | Performed by: FAMILY MEDICINE

## 2024-05-14 PROCEDURE — 1159F MED LIST DOCD IN RCRD: CPT | Performed by: FAMILY MEDICINE

## 2024-05-14 PROCEDURE — 1036F TOBACCO NON-USER: CPT | Performed by: FAMILY MEDICINE

## 2024-05-14 RX ORDER — AZITHROMYCIN 250 MG/1
TABLET, FILM COATED ORAL DAILY
Qty: 6 TABLET | Refills: 3 | Status: SHIPPED | OUTPATIENT
Start: 2024-05-14 | End: 2024-05-19

## 2024-05-14 RX ORDER — METHYLPREDNISOLONE 4 MG/1
TABLET ORAL
Qty: 21 TABLET | Refills: 3 | Status: SHIPPED | OUTPATIENT
Start: 2024-05-14 | End: 2024-05-21

## 2024-05-14 ASSESSMENT — ENCOUNTER SYMPTOMS
VOMITING: 0
HEADACHES: 0
CHEST TIGHTNESS: 1
ARTHRALGIAS: 0
DIZZINESS: 0
POLYDIPSIA: 0
DIARRHEA: 0
MYALGIAS: 0
PALPITATIONS: 0
WEAKNESS: 0
NUMBNESS: 0
WHEEZING: 1
FATIGUE: 0
COUGH: 1
SHORTNESS OF BREATH: 1
FREQUENCY: 0
APPETITE CHANGE: 0
POLYPHAGIA: 0
NAUSEA: 0

## 2024-05-14 NOTE — PROGRESS NOTES
Discharge Facility: [facility]  Discharge Diagnosis:  Healthcare-associated pneumonia  Admission Date: 5/10/2024  Discharge Date: 5/13/2024    PCP Appointment Date: 5/14/2024  Specialist Appointment Date:   -hem/onc 5/16/2024    Hospital Encounter and Summary: Linked     START taking:   levoFLOXacin (Levaquin)    No TCM call completed. Patient has an appointment scheduled within 2 business days of hospital discharge.

## 2024-05-14 NOTE — PROGRESS NOTES
"Subjective   Patient ID: Matt Erwin is a 68 y.o. male who presents for Follow-up (1 day follow up from hospital stay for pneumonia. Pt states he went to the ER for CP and SOB. He states he was feeling better when he was released but since he has been having slight pain and discomfort in the left chest that has since subsided. ).    HPI     Review of Systems   Constitutional:  Negative for appetite change and fatigue.   Eyes:  Negative for visual disturbance.   Respiratory:  Positive for cough, chest tightness, shortness of breath and wheezing.    Cardiovascular:  Positive for chest pain. Negative for palpitations and leg swelling.   Gastrointestinal:  Negative for diarrhea, nausea and vomiting.   Endocrine: Negative for polydipsia, polyphagia and polyuria.   Genitourinary:  Negative for frequency and urgency.   Musculoskeletal:  Negative for arthralgias and myalgias.   Neurological:  Negative for dizziness, syncope, weakness, numbness and headaches.       Objective   /84   Pulse 81   Temp 36.6 °C (97.9 °F)   Resp 21   Ht 1.803 m (5' 11\")   Wt 108 kg (239 lb)   SpO2 94%   BMI 33.33 kg/m²     Physical Exam  Constitutional:       Appearance: Normal appearance.   HENT:      Head: Normocephalic.   Pulmonary:      Effort: Pulmonary effort is normal.      Breath sounds: Wheezing and rhonchi present.      Comments: Walking pulse ox pt dropped to 83  Musculoskeletal:      Cervical back: Neck supple.   Skin:     General: Skin is warm and dry.   Psychiatric:         Mood and Affect: Mood normal.         Assessment/Plan   Problem List Items Addressed This Visit    None  Visit Diagnoses         Codes    Chronic obstructive pulmonary disease with acute exacerbation (Multi)    -  Primary J44.1    Relevant Medications    methylPREDNISolone (Medrol Dospak) 4 mg tablets    azithromycin (Zithromax) 250 mg tablet    Other Relevant Orders    Referral to Pulmonology    CARLOS (obstructive sleep apnea)     G47.33    Relevant " Orders    Positive Airway Pressure (PAP) Therapy    Referral to Pulmonology

## 2024-05-15 LAB
ATRIAL RATE: 107 BPM
BACTERIA BLD CULT: NORMAL
BACTERIA BLD CULT: NORMAL
P AXIS: 77 DEGREES
P OFFSET: 205 MS
P ONSET: 155 MS
PR INTERVAL: 140 MS
Q ONSET: 225 MS
QRS COUNT: 18 BEATS
QRS DURATION: 88 MS
QT INTERVAL: 326 MS
QTC CALCULATION(BAZETT): 435 MS
QTC FREDERICIA: 395 MS
R AXIS: 54 DEGREES
T AXIS: 66 DEGREES
T OFFSET: 388 MS
VENTRICULAR RATE: 107 BPM

## 2024-05-16 ENCOUNTER — APPOINTMENT (OUTPATIENT)
Dept: HEMATOLOGY/ONCOLOGY | Facility: CLINIC | Age: 69
End: 2024-05-16
Payer: MEDICARE

## 2024-05-16 ENCOUNTER — TELEMEDICINE (OUTPATIENT)
Dept: HEMATOLOGY/ONCOLOGY | Facility: CLINIC | Age: 69
End: 2024-05-16
Payer: MEDICARE

## 2024-05-16 DIAGNOSIS — C34.31 MALIGNANT NEOPLASM OF LOWER LOBE OF RIGHT LUNG (MULTI): ICD-10-CM

## 2024-05-16 PROCEDURE — 1111F DSCHRG MED/CURRENT MED MERGE: CPT | Performed by: INTERNAL MEDICINE

## 2024-05-16 PROCEDURE — 99441 PR PHYS/QHP TELEPHONE EVALUATION 5-10 MIN: CPT | Performed by: INTERNAL MEDICINE

## 2024-05-16 PROCEDURE — 1159F MED LIST DOCD IN RCRD: CPT | Performed by: INTERNAL MEDICINE

## 2024-05-16 NOTE — PROGRESS NOTES
Telephone visit with patient    Recent hospitalization with pneumonia - overall is feeling better, just feels has been back and forth with recurrent PNA issues since surgery. Pain is overall better. Oxygen at times in the 88-89% range. Planning pulm visit in June    Reviewed recent CT scan, overall improvement in previously concerning areas, suggesting they were likely infectious/inflammatory. Area concerning for PNA on the L.     - Recommend repeat CT chest with IV contrast in 6 weeks to ensure resolution of opacity  - Encouraged to call if any issues prior to follow-up  - Will meet with pulmonology in June for management of COPD    Time on call: 9:01    Nia Moran MD  [facility]

## 2024-05-17 ENCOUNTER — PATIENT OUTREACH (OUTPATIENT)
Dept: PRIMARY CARE | Facility: CLINIC | Age: 69
End: 2024-05-17
Payer: MEDICARE

## 2024-05-17 NOTE — PROGRESS NOTES
Unable to reach patient for call back after patient's follow up appointment with PCP. 5/14/2024  M with call back number for patient to call if needed   If no voicemail available call attempts x 2 were made to contact the patient to assist with any questions or concerns patient may have.

## 2024-06-06 LAB
ACID FAST STN SPEC: NORMAL
MYCOBACTERIUM SPEC CULT: NORMAL

## 2024-06-13 LAB
ACID FAST STN SPEC: ABNORMAL
MYCOBACTERIUM SPEC CULT: ABNORMAL

## 2024-06-14 ENCOUNTER — TELEPHONE (OUTPATIENT)
Dept: PRIMARY CARE | Facility: CLINIC | Age: 69
End: 2024-06-14
Payer: MEDICARE

## 2024-06-14 NOTE — TELEPHONE ENCOUNTER
"Lab called to inform you of critical results for AFB Culture/Smear that was completed 05/03/2024.   Results were positive reading , \" Isolated : acid fast bacilli\"  "

## 2024-06-17 ENCOUNTER — PATIENT OUTREACH (OUTPATIENT)
Dept: PRIMARY CARE | Facility: CLINIC | Age: 69
End: 2024-06-17
Payer: MEDICARE

## 2024-06-17 ENCOUNTER — TELEPHONE (OUTPATIENT)
Dept: HEMATOLOGY/ONCOLOGY | Facility: HOSPITAL | Age: 69
End: 2024-06-17

## 2024-06-18 ENCOUNTER — LAB (OUTPATIENT)
Dept: LAB | Facility: LAB | Age: 69
End: 2024-06-18
Payer: MEDICARE

## 2024-06-18 ENCOUNTER — TELEPHONE (OUTPATIENT)
Dept: SCHEDULING | Age: 69
End: 2024-06-18

## 2024-06-18 DIAGNOSIS — C34.31 MALIGNANT NEOPLASM OF LOWER LOBE OF RIGHT LUNG (MULTI): ICD-10-CM

## 2024-06-18 LAB
CREAT SERPL-MCNC: 1.1 MG/DL (ref 0.5–1.3)
EGFRCR SERPLBLD CKD-EPI 2021: 73 ML/MIN/1.73M*2

## 2024-06-18 PROCEDURE — 36415 COLL VENOUS BLD VENIPUNCTURE: CPT

## 2024-06-18 PROCEDURE — 82565 ASSAY OF CREATININE: CPT

## 2024-06-20 LAB
ACID FAST STN SPEC: ABNORMAL
MYCOBACTERIUM SPEC CULT: ABNORMAL

## 2024-06-21 ENCOUNTER — HOSPITAL ENCOUNTER (OUTPATIENT)
Dept: RADIOLOGY | Facility: CLINIC | Age: 69
Discharge: HOME | End: 2024-06-21
Payer: MEDICARE

## 2024-06-21 DIAGNOSIS — C34.31 MALIGNANT NEOPLASM OF LOWER LOBE OF RIGHT LUNG (MULTI): ICD-10-CM

## 2024-06-21 PROCEDURE — 2550000001 HC RX 255 CONTRASTS: Performed by: INTERNAL MEDICINE

## 2024-06-21 PROCEDURE — 71260 CT THORAX DX C+: CPT

## 2024-06-24 NOTE — PROGRESS NOTES
"[facility] Infectious Diseases Consultation Note    Date of Consultation/Follow-up: 6/24/2024  Primary MD: Germain Pérez DO    Reason For Consult: Sputum culture with AFB positivity    History Of Present Illness  Norma Erwin \"Torey" is a 68 y.o. male with NSCLC (SCC) s/p R partial lobectomy 2/27/23, COPD with emphysema, CARLOS on CPAP who was admitted 5/10-5/13 with ALVINO pneumonia.    Prior to admission, he had 2 days of decreased appetite, pleuritic chest pain and SOB with chills and change in sputum to green coloration. He was treated with CTX/azithro along with steroid/COPD therapy and discharged on levofloxacin to complete 5 days of therapy. Rcx with sputum contamination. 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 He saw his PCP in 5/2024 and Pulmonologist in 6/2024. Repeat CT scan 6/21/24: decreased size in consolidative opacity though more dense in ALVINO.    Mr. Erwin has had a chronic cough and CHRISTIANSON since his lobectomy last year. Today Mr. Erwin reports feeling mostly back to his baseline after his recent hospitalization without recurrence of pleuritic chest pain, and his breathing feels back to his normal. He has no fevers, chills, sweats, or weight loss. His cough feels as if if comes from his throat, most notable in AM after albuterol use; his cough is productive of yellowish-green sputum. He denies cough after eating/swallowing, but he does occasionally have some trouble swallowing solids. He has had a recent history notable for multiple dental issues/dental work.    Social/Exposure History  -Born: [facility], traveled to [place] at age 1  -TB contacts: Mother diagnosed when he was 3 or 4.  She was hospitalized for long time at [facility]. 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 [employer]). Traveled all over [place] ([place], [place], [place], [place]). Close contact with animals (polar bears, whales, dolphins) during that time. 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 1.5 packs since 14    Past Medical History  He has no past medical history on file.    Surgical History  He has a past surgical history that includes Other surgical history (11/28/2022); CT guided percutaneous biopsy lung (01/12/2023); and Lung removal, partial (Right, 02/27/2023).     Social History     Occupational History    Not on file   Tobacco Use    Smoking status: Former     Types: Cigarettes    Smokeless tobacco: Never   Substance and Sexual Activity    Alcohol use: Not on file    Drug use: Not on file    Sexual activity: Not on file     Travel History   Travel since 05/24/24    No documented travel since 05/24/24            Family History  No family history on file.  Allergies  Patient has no known allergies.     Immunization History   Administered Date(s) Administered    Pfizer COVID-19 vaccine, bivalent, age 12 years and older (30 mcg/0.3 mL) 10/22/2022     Medications  Current Outpatient Medications on File Prior to Visit   Medication Sig Dispense Refill    famotidine-Ca carb-mag hydrox (Pepcid Complete) -165 mg chewable tablet Chew 1 tablet once daily as needed for heartburn (heartburn).      ibuprofen 200 mg tablet Take 2-4 tablets (400-800 mg) by mouth every 6 hours if needed for mild pain (1 - 3) (patient states that he takes almost daily).      ipratropium-albuteroL (Duo-Neb) 0.5-2.5 mg/3 mL nebulizer solution Inhale 3 mL every 4 hours if needed for wheezing or  "shortness of breath.      Trelegy Ellipta 200-62.5-25 mcg blister with device Inhale 1 puff once daily.      Ventolin HFA 90 mcg/actuation inhaler Inhale 2 puffs every 4 hours if needed for wheezing or shortness of breath.       No current facility-administered medications on file prior to visit.     Review of Systems     Objective  Range of Vitals (last 24 hours)  Vitals:    06/25/24 0955   BP: 119/79   Pulse: 81   Temp: 36.6 °C (97.8 °F)       Daily Weight  05/14/24 : 108 kg (239 lb)    There is no height or weight on file to calculate BMI.     Physical Exam  GENERAL APPEARANCE: Awake, alert and not in any distress.  HEENT: Atraumatic and normocephalic. No oral lesions. Dentition with areas of caries and prior tooth removals but no   THROAT: No ulcers or thrush  NECK: Supple, no LAD  CARDIAC: Regular, appropriate rate, no murmurs appreciated  LUNGS: Bilateral end-expiratory wheezing noted. No significant focal crackles noted.  ABDOMEN: Soft and nontender. No HSM  MUSCULOSKELETAL: No swelling of major joints  EXTREMITIES: No edema  NEUROLOGICAL: Well oriented and answers appropriately  SKIN: No rash or ulcers     Relevant Results    Labs              CrCl cannot be calculated (Patient's most recent lab result is older than the maximum 3 days allowed.).  Sedimentation Rate   Date Value Ref Range Status   04/21/2023 9 0 - 20 mm/h Final       Microbiology  Susceptibility data from last 90 days.  Collected Specimen Info Organism   05/03/24 Fluid from SPUTUM Acid Fast Bacilli   05/03/24 Fluid from SPUTUM Pichia ezio     Assessment/Plan  Norma Erwin \"Lad\" is a 68 y.o. male with NSCLC (SCC) s/p R partial lobectomy 2/27/23, COPD with emphysema, CARLOS on CPAP who was admitted 5/10-5/13 with ALVINO pneumonia. Prior to admission, he had 2 days of decreased appetite, pleuritic chest pain and SOB with chills and change in sputum to green coloration. He was treated with CTX/azithro along with steroid/COPD therapy and " discharged on levofloxacin to complete 5 days of therapy. Rcx with sputum contamination. 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 Repeat CT scan 6/21/24: decreased size in consolidative opacity though more dense.    Assessment:  My leading suspicion is that his clinical and CT findings are suggestive of a typical bacterial pneumonia. His symptoms have returned to recent baseline after his hospitalization, which argues against TB or NTMs given these are either not azithromycin-responsive and/or require protracted therapy for improvement. He has remote TB exposures but with subsequent negative TB test when younger and no recent risk factors/exposures otherwise. His sputum sample was contaminated with oral gino, which explains the (equivalent) Candida krusei in his sputum and likely is responsible for the AFB organism identified (which I suspect may be an colonizing NTM in setting of his recent dental issues). His CT scan shows decreased size of the ALVINO lesion, and I suspect the noted increased density is due to improved aeration of the medial airspaces leading to compression of the lesion laterally on CT images. Aspiration (silent or otherwise) is a possibility among others here given location of lesion and recent dental issues in setting of occasional trouble swallowing.    In the event the AFB is a true pathogen, we should have further information regarding his AFB isolate in the upcoming week or so. Will obtain AFB sputum (need at least 2 positive samples for diagnosis of NTM pulmonary infection) and repeat CT scan in 4 weeks time to see if his pulmonary lesion improves or not. Will send for some fungal testing as well.    Plan  -Currently suspect he had a resolved bacterial (or other) pneumonia that has improved as explained above, but will send the below work-up   -Will follow-up AFB isolate identification. Called lab yesterday, who said they sent his  isolate for identification and will hopefully have more information soon  -Repeat AFB sputum x2. Reviewed collection techniques today  -Lower suspicion, but will send urine histo antigen, blastomyces antibodies, cryptococcal antigen, galactomannan  -Repeat CT scan in 4 weeks time. If persistent lesion, should consider bronchoscopy given his limited ability to produce deep sputum samples  -Consider SLP eval if worsening dysphagia episodes and/or recurrent pneumonia episodes in future  -Follow-up TBD pending the above    I spent 60 minutes in the care of this patient, including chart review, patient interview/exam, and documentation.    Alhaji Bradford MD

## 2024-06-24 NOTE — PROGRESS NOTES
"Fred Erwin  is a 68 y.o. male who presents for evaluation of right upper lobe lung cancer status postsurgical resection 2/27/2023.     This patient presented with a an abnormal x-ray in November 2022. He was referred for a CT scan which confirmed a right upper lobe lung lesion. He was referred for a CT-guided biopsy which was performed in January 2023 and showed poorly differentiated carcinoma. He was referred for a PET/CT which showed a right upper lobe lesion with no evidence of metastatic disease. I recommended surgical resection. The patient was taken to the operating room on 2/27/2023 and underwent a stable right upper lobe segmental resection. He did well and was discharged home.     Currently the patient is  feeling \"up and down\". He was recently diagnosed withpneumonia and has been receiving treatment with ID. He feels difficult breathing, \"gets winded easy\" . He denies the following symptoms: chest pain, shortness of breath at rest, cough, hemoptysis, fevers, chills, and weight loss.      There have been no significant changes to their documented medical, surgical and family history.     He  reports that he has quit smoking. His smoking use included cigarettes. He has never used smokeless tobacco.    Objective   Physical Exam  Phone visit  Diagnostic Studies  === 06/21/24 ===    CT CHEST W IV CONTRAST    - Impression -  1.  Large consolidative opacity in the left upper lobe has decreased  in size from the prior but appears more dense from the previous  study. This may represent ongoing pneumonia with more aggressive  pathology also in the differential. Recommend short-term follow-up in  4 weeks after treatment to confirm further improvement/resolution  2. Redemonstration of areas of pleural thickening and nodularity on  the right, similar to the prior. Small right pleural effusion also  appears unchanged.  3. Redemonstration of bilateral renal lesions, similar to the prior.    Signed by: " Hugo Guardado 6/22/2024 1:25 PM  Dictation workstation:   [id number]    Assessment/Plan   Overall, I believe that the patient has no evidence of cancer recurrence.     Based on the patient's clinical presentation and my review of their radiographic imaging, I believe they have no evidence of cancer recurrence.  I recommend ongoing radiographic screening.    I believe that the cause of his symptoms are related to his current pneumonia like illness. I believe that the ID team is best suited to treat this.     I recommend  treatment by ID, cancer follow-up in 6 months (phone call)    I discussed this in detail with the patient, including a discussion of alternatives. They were comfortable with this approach.     Michael Auguste MD  [phone]

## 2024-06-24 NOTE — CARE PLAN
Problem: Pain  Goal: My pain/discomfort is manageable  Outcome: Progressing     Problem: Safety  Goal: Patient will be injury free during hospitalization  Outcome: Progressing  Goal: I will remain free of falls  Outcome: Progressing     Problem: Daily Care  Goal: Daily care needs are met  Outcome: Progressing     Problem: Psychosocial Needs  Goal: Demonstrates ability to cope with hospitalization/illness  Outcome: Progressing  Goal: Collaborate with me, my family, and caregiver to identify my specific goals  Outcome: Progressing  Flowsheets (Taken 5/11/2024 7134)  Cultural Requests During Hospitalization: none  Spiritual Requests During Hospitalization: none     Problem: Discharge Barriers  Goal: My discharge needs are met  Outcome: Progressing   The patient's goals for the shift include      The clinical goals for the shift include no new shortness of breath       Please advise

## 2024-06-25 ENCOUNTER — APPOINTMENT (OUTPATIENT)
Dept: INFECTIOUS DISEASES | Facility: CLINIC | Age: 69
End: 2024-06-25
Payer: MEDICARE

## 2024-06-25 VITALS
BODY MASS INDEX: 33.46 KG/M2 | HEART RATE: 81 BPM | SYSTOLIC BLOOD PRESSURE: 119 MMHG | HEIGHT: 71 IN | WEIGHT: 239 LBS | TEMPERATURE: 97.8 F | DIASTOLIC BLOOD PRESSURE: 79 MMHG

## 2024-06-25 DIAGNOSIS — R89.9 ACID-FAST BACTERIA PRESENT: ICD-10-CM

## 2024-06-25 DIAGNOSIS — J98.4 PULMONARY LESION: Primary | ICD-10-CM

## 2024-06-25 PROCEDURE — 1159F MED LIST DOCD IN RCRD: CPT | Performed by: STUDENT IN AN ORGANIZED HEALTH CARE EDUCATION/TRAINING PROGRAM

## 2024-06-25 PROCEDURE — 99205 OFFICE O/P NEW HI 60 MIN: CPT | Performed by: STUDENT IN AN ORGANIZED HEALTH CARE EDUCATION/TRAINING PROGRAM

## 2024-06-25 PROCEDURE — 1126F AMNT PAIN NOTED NONE PRSNT: CPT | Performed by: STUDENT IN AN ORGANIZED HEALTH CARE EDUCATION/TRAINING PROGRAM

## 2024-06-25 ASSESSMENT — PAIN SCALES - GENERAL: PAINLEVEL: 0-NO PAIN

## 2024-06-25 NOTE — LETTER
06/25/24    Germain Pérez DO  [id number]  [facility] [address]  [facility] [address]      Dear Dr. Germain Pérez DO,    I am writing to confirm that your patient, Matt Erwin, received care in my office on 06/25/24. I have enclosed a summary of the care provided to Matt for your reference.    Please contact me with any questions you may have regarding the visit.    Sincerely,         Alhaji Bradford MD  [address] [facility] [address]  [facility] [address]    CC: No Recipients

## 2024-06-25 NOTE — LETTER
06/25/24    Germain Pérez DO  [address]  [facility] [address]  [facility] [address]      Dear Dr. Germain Pérez DO,    Thank you for referring your patient, Matt Erwin, to receive care in my office. I have enclosed a summary of the care provided to Matt on 06/25/24.    Please contact me with any questions you may have regarding the visit.    Sincerely,         Alhaji Bradford MD  [address] [facility] [address]  [facility] [address]    CC: No Recipients

## 2024-06-26 LAB
ACID FAST STN SPEC: ABNORMAL
MYCOBACTERIUM SPEC CULT: ABNORMAL

## 2024-06-27 ENCOUNTER — TELEMEDICINE (OUTPATIENT)
Dept: SURGERY | Facility: HOSPITAL | Age: 69
End: 2024-06-27
Payer: MEDICARE

## 2024-06-27 ENCOUNTER — TELEMEDICINE (OUTPATIENT)
Dept: HEMATOLOGY/ONCOLOGY | Facility: CLINIC | Age: 69
End: 2024-06-27
Payer: MEDICARE

## 2024-06-27 DIAGNOSIS — C34.11 MALIGNANT NEOPLASM OF UPPER LOBE OF RIGHT LUNG (MULTI): Primary | ICD-10-CM

## 2024-06-27 DIAGNOSIS — C34.31 MALIGNANT NEOPLASM OF LOWER LOBE OF RIGHT LUNG (MULTI): ICD-10-CM

## 2024-06-27 PROCEDURE — 99441 PR PHYS/QHP TELEPHONE EVALUATION 5-10 MIN: CPT | Performed by: THORACIC SURGERY (CARDIOTHORACIC VASCULAR SURGERY)

## 2024-06-28 ENCOUNTER — LAB (OUTPATIENT)
Dept: LAB | Facility: LAB | Age: 69
End: 2024-06-28
Payer: MEDICARE

## 2024-06-28 DIAGNOSIS — R89.9 ACID-FAST BACTERIA PRESENT: ICD-10-CM

## 2024-06-28 DIAGNOSIS — J98.4 PULMONARY LESION: ICD-10-CM

## 2024-06-28 LAB
HOLD SPECIMEN: NORMAL
HOLD SPECIMEN: NORMAL

## 2024-06-28 PROCEDURE — 87116 MYCOBACTERIA CULTURE: CPT

## 2024-07-02 LAB
ASPERGILLUS GALACTOMANNAN EIA,SERUM: 0.1
CRYPTOC AG SPEC QL LA: NEGATIVE

## 2024-07-02 NOTE — PROGRESS NOTES
Telephone call to patient  He is doing ok, frustrated with ongoing pulmonary/ID issues. Treated for pneumonia.    Discussed that I agree with ongoing care driven primarily by ID team. He has repeat Ct scan planned in about 4 weeks. If findings not improved, plan possible bronchoscopy.  He would like to continue following with surgery/ID/pulmonary for now. He will let me know when CT is complete for review and will set up follow-up with me as needed depending on findings    Nia Moran MD  [facility]    Time on call: 4:30

## 2024-07-03 LAB
ACID FAST STN SPEC: NORMAL
ACID FAST STN SPEC: NORMAL
H CAPSUL AG UR QL: NOT DETECTED
MYCOBACTERIUM SPEC CULT: NORMAL
MYCOBACTERIUM SPEC CULT: NORMAL
SCAN RESULT: NORMAL

## 2024-07-04 LAB — B DERMAT AB TITR SER: NEGATIVE {TITER}

## 2024-07-09 ENCOUNTER — OFFICE VISIT (OUTPATIENT)
Dept: PULMONOLOGY | Facility: CLINIC | Age: 69
End: 2024-07-09
Payer: MEDICARE

## 2024-07-09 VITALS
SYSTOLIC BLOOD PRESSURE: 121 MMHG | OXYGEN SATURATION: 92 % | HEIGHT: 71 IN | WEIGHT: 240 LBS | HEART RATE: 87 BPM | DIASTOLIC BLOOD PRESSURE: 73 MMHG | BODY MASS INDEX: 33.6 KG/M2 | TEMPERATURE: 98.1 F

## 2024-07-09 DIAGNOSIS — G47.33 OSA (OBSTRUCTIVE SLEEP APNEA): ICD-10-CM

## 2024-07-09 DIAGNOSIS — J44.1 CHRONIC OBSTRUCTIVE PULMONARY DISEASE WITH ACUTE EXACERBATION (MULTI): Primary | ICD-10-CM

## 2024-07-09 PROCEDURE — 99215 OFFICE O/P EST HI 40 MIN: CPT | Performed by: NURSE PRACTITIONER

## 2024-07-09 PROCEDURE — 1159F MED LIST DOCD IN RCRD: CPT | Performed by: NURSE PRACTITIONER

## 2024-07-09 PROCEDURE — 99205 OFFICE O/P NEW HI 60 MIN: CPT | Performed by: NURSE PRACTITIONER

## 2024-07-09 RX ORDER — BUDESONIDE, GLYCOPYRROLATE, AND FORMOTEROL FUMARATE 160; 9; 4.8 UG/1; UG/1; UG/1
2 AEROSOL, METERED RESPIRATORY (INHALATION)
COMMUNITY
End: 2024-07-09 | Stop reason: SDUPTHER

## 2024-07-09 RX ORDER — BUDESONIDE, GLYCOPYRROLATE, AND FORMOTEROL FUMARATE 160; 9; 4.8 UG/1; UG/1; UG/1
2 AEROSOL, METERED RESPIRATORY (INHALATION)
Qty: 10.7 G | Refills: 11 | Status: SHIPPED | OUTPATIENT
Start: 2024-07-09

## 2024-07-09 RX ORDER — BUDESONIDE 3 MG/1
6 CAPSULE, COATED PELLETS ORAL EVERY MORNING
COMMUNITY
End: 2024-07-09 | Stop reason: WASHOUT

## 2024-07-09 ASSESSMENT — ENCOUNTER SYMPTOMS
WEAKNESS: 0
PALPITATIONS: 0
FATIGUE: 1
VOICE CHANGE: 1
VOMITING: 0
ARTHRALGIAS: 1
ABDOMINAL PAIN: 0
FEVER: 0
NUMBNESS: 0
JOINT SWELLING: 0
AGITATION: 0
SINUS PRESSURE: 0
HEADACHES: 0
RHINORRHEA: 1
NERVOUS/ANXIOUS: 0
NAUSEA: 0
MYALGIAS: 0
DIZZINESS: 1
EYE PAIN: 0
DIARRHEA: 0
BACK PAIN: 1

## 2024-07-09 NOTE — PROGRESS NOTES
" Patient: Norma Erwin    [id number]  : 1955 -- AGE 68 y.o.    Provider: WYATT Ortega-CNP     Location [facility]   Service Date: 2024              [facility]  New Visit Note      HISTORY OF PRESENT ILLNESS     The patient's referring provider is: Germain Pérez, DO    HISTORY OF PRESENT ILLNESS   Norma Erwin \"Torey" is a 68 y.o. male who presents to a [facility] for an evaluation with concerns of COPD. I have independently interviewed and examined the patient in the office and reviewed available records.    Current History    On today's visit, the patient reports he was admitted in 2024 with pnuemonia - discharged home on levaquin. He had been seeing Dr. Escobedo - somewhat frustrated.  He has been getting sick - he feels he does ok for 4-6 weeks and then ends up not feeling well again. He has been on antibiotics/ steroids every 6 weeks or so.  He states this has been going on since the surgery. He states prior to the 2024 episode it was a few months prior that he needed antibiotics/ steroids.  He has needed then 3-4 times in the last year for episodes of breathing.  He has been using his Trelegy daily. He has been on the trelegy for a while - was on advair prior per Dr. Vazquez his prior PCP. He states he was switched to the trelegy about the time of his surgery.  He states with these episodes he does not feel overly sick -- more the breathing is acting up.  He continues to work 2-3 days a week. He states he has CHRISTIANSON with going up/down stairs.  He uses his PRN duonebs once a day - was using more often post pneumonia. He states he rarely uses his rescue -- usually more with heat/ humidity.  He states most of the time his cough is dry -- states voice is weird. He states in the morning he will have mucous in his throat -- has to do a lot of throat clearing. He states he is able to get a " little ball of phlegm up and then his throat feels clear - usually in the morning after the trelegy. He does notice wheezing. He denies any SOB at rest. He will occasionally get a twinge in his left upper chest/ shoulder - when he had the pneumonia he had that pain, but it was more stabbing and bothersome.  He has been doing pepcid/ tums for GERD. He states he thinks vodka triggers his reflux as well as certina foods.  He has a sample of breztri he is going to try once he finishes the trelegy that he has. He has a POC at home -- did a walkign test in Dr. Lechuga office. He has a pulse ox at home -- 94-96% at rest- will drop into the high 80s. He has had a lot of dental issues over the last year - teeth breaking at the gum/ fillings falling out. He is going to have his teeth removed later this week and will then get dentures.     CAT today 18      Previous pulmonary history: He was previously told he has COPD. He currently is on no supplemental oxygen.  He has never been to pulmonary rehab.     Inhalers/nebulized medications: trelegy, duonebs, Prn albuterol     Hospitalization History: 5/2024     Sleep history: CARLOS - on CPAP  -- 2L      ALLERGIES AND MEDICATIONS     ALLERGIES  No Known Allergies    MEDICATIONS  Current Outpatient Medications   Medication Sig Dispense Refill    famotidine-Ca carb-mag hydrox (Pepcid Complete) -165 mg chewable tablet Chew 1 tablet once daily as needed for heartburn (heartburn).      ibuprofen 200 mg tablet Take 2-4 tablets (400-800 mg) by mouth every 6 hours if needed for mild pain (1 - 3) (patient states that he takes almost daily).      ipratropium-albuteroL (Duo-Neb) 0.5-2.5 mg/3 mL nebulizer solution Inhale 3 mL every 4 hours if needed for wheezing or shortness of breath.      Trelegy Ellipta 200-62.5-25 mcg blister with device Inhale 1 puff once daily.      Ventolin HFA 90 mcg/actuation inhaler Inhale 2 puffs every 4 hours if needed for wheezing or shortness of breath.       budesonide EC (Entocort EC) 3 mg 24 hr capsule Take 2 capsules (6 mg) by mouth once daily in the morning. BREZTRI AEOSPERE 160 MCG/9 MCG/4.8 MCG       No current facility-administered medications for this visit.         PAST HISTORY     PAST MEDICAL HISTORY  - NSCLC ([facility]CC) s/p right partial lobectomy 2/27/23   - COPD/ emphysema   - CARLOS on CPAP - 2L    - 5/2024 - pneumonia admission   - GERD     PAST SURGICAL HISTORY  Past Surgical History:   Procedure Laterality Date    CT GUIDED PERCUTANEOUS BIOPSY LUNG  01/12/2023    CT GUIDED PERCUTANEOUS BIOPSY LUNG 1/12/2023 DOCTOR OFFICE LEGACY    LUNG REMOVAL, PARTIAL Right 02/27/2023    OTHER SURGICAL HISTORY  11/28/2022    Finger amputation       IMMUNIZATION HISTORY  Immunization History   Administered Date(s) Administered    Pfizer COVID-19 vaccine, bivalent, age 12 years and older (30 mcg/0.3 mL) 10/22/2022       SOCIAL HISTORY  Smoking: [age] 1.5ppd ~80 pack years   Alcohol: couple drinks a day    Illicit drugs:  none     OCCUPATIONAL/ENVIRONMENTAL HISTORY  Traveled [place] as  for sea life [employer] (ex [employer]). Close contact with animals (polar bears, whales, dolphins). 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  Atelectasis or focal infiltrate since the prior exam.      Chest CT Scan     12/2/22: Spiculated mid posterolateral subpleural right upper lobe lung mass  measuring 18 x 18 x 14 mm, with linear pleural attachments  posterolaterally. This is a lung cancer until proven otherwise.  No suspicious mediastinal, hilar, or axillary adenopathy in  this  unenhanced exam. Incidental nonspecific 6 mm fissural lymph node  along the anterolateral aspect of the right minor fissure.  Small posterior upper pole exophytic right renal cyst.  Mild emphysema.    6/5/23: New changes of right upper lobe wedge resection, with interval   resolution of 1.7 cm solid pulmonary nodule seen in the right upper   lobe on previous exam. Nonspecific linear soft tissue attenuation is   not present along the surgical anastomosis, possibly representing   scarring/granulation tissue/atelectasis, and further evaluation on   follow-up imaging is recommended.   2. A 5-6 mm triangular solid pulmonary nodule in the right middle   lobe is similar in appearance to prior exam, with no evidence of new   suspicious lung nodules present in the lungs bilaterally.   3. Moderate-sized loculated pleural effusion is present on the right.    8/28/23: Postsurgical changes of the right chest again seen with slightly   enlarging partially loculated right pleural effusion since 06/05/2023.    2/27/24: Interval development of bilateral lower lobe multiple solid  nodules measuring up to 2.3 cm. Metastatic disease is definitely a  concern and PET-CT or follow-up study in 3-6 months are  considerations in differentiating between inflammatory and metastatic  lesions.  2. Small right pleural effusion is slightly smaller than the prior  Exam    5/10/24: No evidence of acute pulmonary embolism.  Consolidative opacity in the left upper lobe compatible with  pneumonia. Short-term progress images recommended after treatment to  assure resolution and exclude other underlying obscured pathology.  Small right pleural effusion and postsurgical change of partial  resection in right lower lobe and basilar atelectasis and/or scarring.      6/21/24: Large consolidative opacity in the left upper lobe has decreased  in size from the prior but appears more dense from the previous  study. This may represent ongoing pneumonia with  "more aggressive  pathology also in the differential. Recommend short-term follow-up in  4 weeks after treatment to confirm further improvement/resolution  2. Redemonstration of areas of pleural thickening and nodularity on  the right, similar to the prior. Small right pleural effusion also  appears unchanged.  3. Redemonstration of bilateral renal lesions, similar to the prior.         Echocardiogram     None on record     Other testing/ Labs      Eosinophils Absolute (x10*3/uL)   Date Value   05/13/2024 0.00   05/12/2024 0.00   05/11/2024 0.00     No results found for: \"IGE\"    Respiratory Culture  Lab Results   Component Value Date    RESPCULTSM (A) 05/03/2024     Culture not performed. See Gram stain findings. Recollect if clinically indicated.    GRAMSTAIN (A) 05/03/2024     Gram stain indicates specimen contains significant salivary contamination.     Susceptibility data from last 90 days.  Collected Specimen Info Organism   05/03/24 Fluid from SPUTUM Mycobacterium gordonae   05/03/24 Fluid from SPUTUM Pichia kuiavzevii       Fungal Culture  Lab Results   Component Value Date    FUNGALCULTSM 2 colonies Pichia kudriavzevii (A) 05/03/2024    FUNGALSMEAR (1+) Rare Fungal elements seen (A) 05/03/2024       AFB Culture  Lab Results   Component Value Date    AFBCX  06/28/2024     Culture in progress and will be examined weekly. A result will be issued either when positive or after 8 weeks incubation.    AFBCX  06/28/2024     Culture in progress and will be examined weekly. A result will be issued either when positive or after 8 weeks incubation.    AFBSTAIN No acid fast bacilli seen 06/28/2024    AFBSTAIN No acid fast bacilli seen 06/28/2024    AFBSTAIN No acid fast bacilli seen 05/03/2024         REVIEW OF SYSTEMS     REVIEW OF SYSTEMS  Review of Systems   Constitutional:  Positive for fatigue. Negative for fever.   HENT:  Positive for congestion, postnasal drip, rhinorrhea and voice change. Negative for sinus " "pressure.    Eyes:  Negative for pain and visual disturbance.        +blurred vision    Cardiovascular:  Negative for chest pain, palpitations and leg swelling.   Gastrointestinal:  Negative for abdominal pain, diarrhea, nausea and vomiting.   Endocrine: Negative for cold intolerance and heat intolerance.   Musculoskeletal:  Positive for arthralgias and back pain. Negative for joint swelling and myalgias.   Skin:  Negative for rash.   Neurological:  Positive for dizziness. Negative for weakness, numbness and headaches.   Psychiatric/Behavioral:  Negative for agitation. The patient is not nervous/anxious.          PHYSICAL EXAM     VITAL SIGNS: /73 (BP Location: Left arm, Patient Position: Sitting, BP Cuff Size: Large adult)   Pulse 87   Temp 36.7 °C (98.1 °F)   Ht 1.803 m (5' 11\")   Wt 109 kg (240 lb)   SpO2 92%   BMI 33.47 kg/m²      CURRENT WEIGHT: [unfilled]  BMI: [unfilled]  PREVIOUS WEIGHTS:  Wt Readings from Last 3 Encounters:   07/09/24 109 kg (240 lb)   06/25/24 108 kg (239 lb)   05/14/24 108 kg (239 lb)       Physical Exam  Vitals reviewed.   Constitutional:       General: He is not in acute distress.     Appearance: Normal appearance. He is not ill-appearing or toxic-appearing.   HENT:      Head: Normocephalic.      Nose: No rhinorrhea.   Cardiovascular:      Rate and Rhythm: Normal rate and regular rhythm.      Heart sounds: Normal heart sounds.   Pulmonary:      Effort: Pulmonary effort is normal. No respiratory distress.      Breath sounds: Normal breath sounds. No stridor. No wheezing, rhonchi or rales.   Abdominal:      General: Abdomen is flat.   Musculoskeletal:         General: Normal range of motion.      Right lower leg: No edema.      Left lower leg: No edema.   Skin:     General: Skin is warm and dry.      Nails: There is no clubbing.   Neurological:      General: No focal deficit present.      Mental Status: He is alert and oriented to person, place, and time.   Psychiatric:         " Mood and Affect: Mood normal.         Behavior: Behavior normal.         Judgment: Judgment normal.         ASSESSMENT/PLAN     COPD: no PFTs on record - done at [facility]. Will see if he improves with switching from trelegy to breztri   - switch to breztri 2 puffs twice daily  - rinse mouth out afterwards   - continue albuterol HFA inhaler 2 puffs or albuterol/ ipratroprium nebulizer treatment every 4-6 hours as needed for shortness of breath    - will get baseline PFTs   - will get PFT records from [facility]      2. Pneumonia: 5/2024 admitted for pneumonia - respiratory culture contaminated. AFB +, but following with ID. Repeat CT chest ordered - if not improved then will discuss possible bronch.   - CT chest already ordered by Dr. Moran     3. Chronic hypoxic respiratory failure: using 2L into CPAP at night as well as PRN during the day   - will get updated PFTs as above     4. Lung cancer:   - following with Dr. Moran / Dr. Auguste     5. CARLOS: on CPAP - has difficulty tolerating     Thank you for visiting the Pulmonary clinic today!   Return to clinic  2-3 months after CT and PFTs  or sooner if needed   Christina Narayan CNP  My office -  [phone]- Mary Alice is my nurse.   Radiology scheduling [phone]   Appointment scheduling [phone]   Pulmonary function testing - [phone]

## 2024-07-09 NOTE — PATIENT INSTRUCTIONS
COPD: no PFTs on record - done at [facility]   - switch to breztri 2 puffs twice daily  - rinse mouth out afterwards   - continue albuterol HFA inhaler 2 puffs or albuterol/ ipratroprium nebulizer treatment every 4-6 hours as needed for shortness of breath    - will get baseline PFTs     2. Pneumonia: 5/2024 admitted for pneumonia - respiratory culture contaminated. AFB +, but following with ID. Repeat CT chest ordered - if not improved then will discuss possible bronch.   - CT chest already ordered by Dr. Moran     3. Chronic hypoxic respiratory failure: using 2L into CPAP at night as well as PRN during the day   - will get updated PFTs as above     4. Lung cancer:   - following with Dr. Moran / Dr. Auguste     Thank you for visiting the Pulmonary clinic today!   Return to clinic  2-3 months after CT and PFTs  or sooner if needed   Christina Narayan CNP  My office -  [phone]- Mary Alice is my nurse.   Radiology scheduling [phone]   Appointment scheduling [phone]   Pulmonary function testing - [phone]

## 2024-07-10 ENCOUNTER — TELEPHONE (OUTPATIENT)
Dept: INFECTIOUS DISEASES | Facility: HOSPITAL | Age: 69
End: 2024-07-10
Payer: MEDICARE

## 2024-07-10 LAB
ACID FAST STN SPEC: NORMAL
ACID FAST STN SPEC: NORMAL
MYCOBACTERIUM SPEC CULT: NORMAL
MYCOBACTERIUM SPEC CULT: NORMAL

## 2024-07-10 NOTE — TELEPHONE ENCOUNTER
Updated Mr. Erwin regarding his labs so far, which have returned reassuringly normal. His AFB speciated as M. Gordonae, which is a highly common environmental/water-based contaminant that rarely causes disease even in the extremely immunocompromised. Will see how AFB sputum and CT chest look but suspect things will continue to improve.

## 2024-07-11 LAB
ACID FAST STN SPEC: ABNORMAL
MYCOBACTERIUM SPEC CULT: ABNORMAL

## 2024-07-16 ENCOUNTER — PATIENT OUTREACH (OUTPATIENT)
Dept: PRIMARY CARE | Facility: CLINIC | Age: 69
End: 2024-07-16
Payer: MEDICARE

## 2024-07-18 ENCOUNTER — HOSPITAL ENCOUNTER (OUTPATIENT)
Dept: RESPIRATORY THERAPY | Facility: HOSPITAL | Age: 69
Discharge: HOME | End: 2024-07-18
Payer: MEDICARE

## 2024-07-18 DIAGNOSIS — J44.1 CHRONIC OBSTRUCTIVE PULMONARY DISEASE WITH ACUTE EXACERBATION (MULTI): ICD-10-CM

## 2024-07-18 PROCEDURE — 94726 PLETHYSMOGRAPHY LUNG VOLUMES: CPT

## 2024-07-23 ENCOUNTER — LAB (OUTPATIENT)
Dept: LAB | Facility: LAB | Age: 69
End: 2024-07-23
Payer: MEDICARE

## 2024-07-24 ENCOUNTER — LAB (OUTPATIENT)
Dept: LAB | Facility: LAB | Age: 69
End: 2024-07-24
Payer: MEDICARE

## 2024-07-24 DIAGNOSIS — J44.1 CHRONIC OBSTRUCTIVE PULMONARY DISEASE WITH ACUTE EXACERBATION (MULTI): ICD-10-CM

## 2024-07-24 DIAGNOSIS — J44.1 CHRONIC OBSTRUCTIVE PULMONARY DISEASE WITH ACUTE EXACERBATION (MULTI): Primary | ICD-10-CM

## 2024-07-24 LAB
ACID FAST STN SPEC: NORMAL
ACID FAST STN SPEC: NORMAL
ANION GAP SERPL CALC-SCNC: 13 MMOL/L (ref 10–20)
BUN SERPL-MCNC: 22 MG/DL (ref 6–23)
CALCIUM SERPL-MCNC: 9.4 MG/DL (ref 8.6–10.6)
CHLORIDE SERPL-SCNC: 102 MMOL/L (ref 98–107)
CO2 SERPL-SCNC: 28 MMOL/L (ref 21–32)
CREAT SERPL-MCNC: 1.31 MG/DL (ref 0.5–1.3)
EGFRCR SERPLBLD CKD-EPI 2021: 59 ML/MIN/1.73M*2
GLUCOSE SERPL-MCNC: 97 MG/DL (ref 74–99)
MYCOBACTERIUM SPEC CULT: NORMAL
MYCOBACTERIUM SPEC CULT: NORMAL
POTASSIUM SERPL-SCNC: 4 MMOL/L (ref 3.5–5.3)
SODIUM SERPL-SCNC: 139 MMOL/L (ref 136–145)

## 2024-07-24 PROCEDURE — 36415 COLL VENOUS BLD VENIPUNCTURE: CPT

## 2024-07-24 PROCEDURE — 80048 BASIC METABOLIC PNL TOTAL CA: CPT

## 2024-07-25 ENCOUNTER — HOSPITAL ENCOUNTER (OUTPATIENT)
Dept: RADIOLOGY | Facility: HOSPITAL | Age: 69
Discharge: HOME | End: 2024-07-25
Payer: MEDICARE

## 2024-07-25 DIAGNOSIS — J98.4 PULMONARY LESION: ICD-10-CM

## 2024-07-25 PROCEDURE — 71260 CT THORAX DX C+: CPT | Performed by: RADIOLOGY

## 2024-07-25 PROCEDURE — 71260 CT THORAX DX C+: CPT

## 2024-07-25 PROCEDURE — 2550000001 HC RX 255 CONTRASTS: Performed by: STUDENT IN AN ORGANIZED HEALTH CARE EDUCATION/TRAINING PROGRAM

## 2024-07-26 ENCOUNTER — DOCUMENTATION (OUTPATIENT)
Dept: INFECTIOUS DISEASES | Facility: HOSPITAL | Age: 69
End: 2024-07-26
Payer: MEDICARE

## 2024-07-26 NOTE — PROGRESS NOTES
Called Mr. Erwin and provided his CT scan results.     -07/25/24 CT Chest  IMPRESSION:  A left upper lobe pneumonia developed sometime between PET-CT 13 March 2024 and diagnostic/anatomic CT 10 May 2024      By the follow-up CT 21 June 2024, the abnormality was more confluent  but beginning to appear scar-like      There has been considerable interval decrease in size of the residual  left upper lobe abnormality as I have detailed above, which is of  course reassuring. Morphologically, it is much more reminiscent of  scarring and volume loss rather than an expansile, malignant lesion.  Continued follow-up may be obtained at clinician's discretion      Overall no compelling evidence for an active malignant process in the  chest    A/P:  At this point, his lesion has substantially improved in size and/or residual consolidation (which may continue to improve over time; his age and underlying lung disease history is likely responsible for his slow radiographic improvement). His repeat sputum cultures, including AFB cultures, have been NGTD.     Given he has been largely symptomatically improved to near his baseline in conjunction with the above findings, suspect his ALVINO findings are related to a resolved typical bacterial pneumonia that has been appropriately treated but with slow radiographic response for reasons above. Will not currently require further ID work-up accordingly, but provided with our clinic number for any questions or concerns.

## 2024-08-06 ENCOUNTER — APPOINTMENT (OUTPATIENT)
Dept: PULMONOLOGY | Facility: CLINIC | Age: 69
End: 2024-08-06
Payer: MEDICARE

## 2024-09-09 ENCOUNTER — APPOINTMENT (OUTPATIENT)
Dept: PRIMARY CARE | Facility: CLINIC | Age: 69
End: 2024-09-09
Payer: MEDICARE

## 2024-09-09 VITALS
SYSTOLIC BLOOD PRESSURE: 126 MMHG | DIASTOLIC BLOOD PRESSURE: 82 MMHG | TEMPERATURE: 96.6 F | HEIGHT: 71 IN | WEIGHT: 231.44 LBS | BODY MASS INDEX: 32.4 KG/M2 | HEART RATE: 87 BPM | OXYGEN SATURATION: 92 %

## 2024-09-09 DIAGNOSIS — C34.11 MALIGNANT NEOPLASM OF UPPER LOBE OF RIGHT LUNG (MULTI): ICD-10-CM

## 2024-09-09 DIAGNOSIS — R53.83 FATIGUE, UNSPECIFIED TYPE: ICD-10-CM

## 2024-09-09 DIAGNOSIS — J44.1 CHRONIC OBSTRUCTIVE PULMONARY DISEASE WITH ACUTE EXACERBATION (MULTI): ICD-10-CM

## 2024-09-09 DIAGNOSIS — Z12.11 COLON CANCER SCREENING: ICD-10-CM

## 2024-09-09 DIAGNOSIS — Z13.6 ENCOUNTER FOR LIPID SCREENING FOR CARDIOVASCULAR DISEASE: ICD-10-CM

## 2024-09-09 DIAGNOSIS — Z12.5 PROSTATE CANCER SCREENING: ICD-10-CM

## 2024-09-09 DIAGNOSIS — Z00.00 MEDICARE ANNUAL WELLNESS VISIT, SUBSEQUENT: Primary | ICD-10-CM

## 2024-09-09 DIAGNOSIS — R73.9 HYPERGLYCEMIA: ICD-10-CM

## 2024-09-09 DIAGNOSIS — J44.9 CHRONIC OBSTRUCTIVE PULMONARY DISEASE, UNSPECIFIED COPD TYPE (MULTI): ICD-10-CM

## 2024-09-09 DIAGNOSIS — Z13.220 ENCOUNTER FOR LIPID SCREENING FOR CARDIOVASCULAR DISEASE: ICD-10-CM

## 2024-09-09 DIAGNOSIS — D64.9 FATIGUE ASSOCIATED WITH ANEMIA: ICD-10-CM

## 2024-09-09 PROCEDURE — 99213 OFFICE O/P EST LOW 20 MIN: CPT | Performed by: FAMILY MEDICINE

## 2024-09-09 PROCEDURE — G0009 ADMIN PNEUMOCOCCAL VACCINE: HCPCS | Performed by: FAMILY MEDICINE

## 2024-09-09 PROCEDURE — 90677 PCV20 VACCINE IM: CPT | Performed by: FAMILY MEDICINE

## 2024-09-09 PROCEDURE — G0439 PPPS, SUBSEQ VISIT: HCPCS | Performed by: FAMILY MEDICINE

## 2024-09-09 RX ORDER — BUDESONIDE, GLYCOPYRROLATE, AND FORMOTEROL FUMARATE 160; 9; 4.8 UG/1; UG/1; UG/1
2 AEROSOL, METERED RESPIRATORY (INHALATION)
Qty: 10.7 G | Refills: 11 | Status: SHIPPED | OUTPATIENT
Start: 2024-09-09

## 2024-09-09 ASSESSMENT — PATIENT HEALTH QUESTIONNAIRE - PHQ9
SUM OF ALL RESPONSES TO PHQ9 QUESTIONS 1 AND 2: 0
1. LITTLE INTEREST OR PLEASURE IN DOING THINGS: NOT AT ALL
2. FEELING DOWN, DEPRESSED OR HOPELESS: NOT AT ALL

## 2024-09-09 ASSESSMENT — ENCOUNTER SYMPTOMS
POLYDIPSIA: 0
DIARRHEA: 0
HEADACHES: 0
FREQUENCY: 0
MYALGIAS: 0
ARTHRALGIAS: 0
CHEST TIGHTNESS: 0
APPETITE CHANGE: 0
SHORTNESS OF BREATH: 1
VOMITING: 0
FATIGUE: 0
NUMBNESS: 0
NAUSEA: 0
COUGH: 1
POLYPHAGIA: 0
WEAKNESS: 0
DIZZINESS: 0

## 2024-09-09 ASSESSMENT — ACTIVITIES OF DAILY LIVING (ADL)
GROCERY_SHOPPING: INDEPENDENT
DOING_HOUSEWORK: INDEPENDENT
MANAGING_FINANCES: INDEPENDENT
BATHING: INDEPENDENT
DRESSING: INDEPENDENT
TAKING_MEDICATION: INDEPENDENT

## 2024-09-09 NOTE — ASSESSMENT & PLAN NOTE
Orders:    Follow Up In [facility] - PCP; Future    Follow Up In [facility] - PCP; Future    CBC and Auto Differential; Future    Comprehensive Metabolic Panel; Future    Hemoglobin A1C; Future    Lipid Panel; Future    Prostate Specific Antigen; Future    Thyroid Stimulating Hormone; Future    T4, free; Future

## 2024-09-09 NOTE — PROGRESS NOTES
"Subjective   Reason for Visit: Norma Erwin is an 69 y.o. male here for a Medicare Wellness visit.          Reviewed all medications by prescribing practitioner or clinical pharmacist (such as prescriptions, OTCs, herbal therapies and supplements) and documented in the medical record.    HPI    Patient Care Team:  Germain Pérez DO as PCP - General  Nia Moran MD as Consulting Physician (Hematology and Oncology)     Review of Systems   Constitutional:  Negative for appetite change and fatigue.   Eyes:  Negative for visual disturbance.   Respiratory:  Positive for cough and shortness of breath. Negative for chest tightness.    Cardiovascular:  Negative for chest pain and leg swelling.   Gastrointestinal:  Negative for diarrhea, nausea and vomiting.   Endocrine: Negative for polydipsia, polyphagia and polyuria.   Genitourinary:  Negative for frequency and urgency.   Musculoskeletal:  Negative for arthralgias and myalgias.   Neurological:  Negative for dizziness, syncope, weakness, numbness and headaches.       Objective   Vitals:  /82   Pulse 87   Temp 35.9 °C (96.6 °F)   Ht 1.803 m (5' 11\")   Wt 105 kg (231 lb 7 oz)   SpO2 92%   BMI 32.28 kg/m²       Physical Exam  Constitutional:       Appearance: Normal appearance.   HENT:      Head: Normocephalic.      Right Ear: Tympanic membrane, ear canal and external ear normal.      Left Ear: Tympanic membrane, ear canal and external ear normal.      Nose: Nose normal.      Mouth/Throat:      Mouth: Mucous membranes are moist.      Pharynx: Oropharynx is clear.   Eyes:      Conjunctiva/sclera: Conjunctivae normal.   Cardiovascular:      Rate and Rhythm: Normal rate and regular rhythm.   Pulmonary:      Effort: Pulmonary effort is normal.      Breath sounds: Rhonchi present.   Musculoskeletal:         General: Normal range of motion.      Cervical back: Neck supple.   Skin:     General: Skin is warm and dry.   Neurological:      General: No focal deficit " present.      Mental Status: He is alert and oriented to person, place, and time.   Psychiatric:         Mood and Affect: Mood normal.         Assessment & Plan  Medicare annual wellness visit, subsequent    Orders:    Follow Up In [facility] - PCP; Future    Follow Up In [facility] - PCP; Future    CBC and Auto Differential; Future    Comprehensive Metabolic Panel; Future    Hemoglobin A1C; Future    Lipid Panel; Future    Prostate Specific Antigen; Future    Thyroid Stimulating Hormone; Future    T4, free; Future    Malignant neoplasm of upper lobe of right lung (Multi)    Orders:    Follow Up In [facility] - PCP; Future    Follow Up In [facility] - PCP; Future    CBC and Auto Differential; Future    Comprehensive Metabolic Panel; Future    Hemoglobin A1C; Future    Lipid Panel; Future    Prostate Specific Antigen; Future    Thyroid Stimulating Hormone; Future    T4, free; Future    Chronic obstructive pulmonary disease, unspecified COPD type (Multi)    Orders:    Follow Up In [facility] - PCP; Future    Follow Up In [facility] - PCP; Future    CBC and Auto Differential; Future    Comprehensive Metabolic Panel; Future    Hemoglobin A1C; Future    Lipid Panel; Future    Prostate Specific Antigen; Future    Thyroid Stimulating Hormone; Future    T4, free; Future    Chronic obstructive pulmonary disease with acute exacerbation (Multi)    Orders:    Follow Up In [facility] - PCP; Future    Follow Up In [facility] - PCP; Future    CBC and Auto Differential; Future    Comprehensive Metabolic Panel; Future    Hemoglobin A1C; Future    Lipid Panel; Future    Prostate Specific Antigen; Future    Thyroid Stimulating Hormone; Future    T4, free; Future    budesonide-glycopyr-formoterol (Breztri Aerosphere) 160-9-4.8 mcg/actuation HFA aerosol inhaler; Inhale 2 puffs 2 times a day.    Hyperglycemia    Orders:    Follow Up In [facility] - PCP; Future    Follow Up In [facility] - PCP; Future    CBC and Auto Differential; Future    Comprehensive Metabolic Panel; Future    Hemoglobin A1C; Future    Lipid Panel; Future    Prostate Specific Antigen; Future    Thyroid Stimulating Hormone; Future    T4, free; Future    Prostate cancer screening    Orders:    Follow Up In [facility] - PCP; Future    Follow Up In [facility] - PCP; Future    CBC and Auto Differential; Future    Comprehensive Metabolic Panel; Future    Hemoglobin A1C; Future    Lipid Panel; Future    Prostate Specific Antigen; Future    Thyroid Stimulating Hormone; Future    T4, free; Future    Colon cancer screening    Orders:    Follow Up In [facility] - PCP; Future    Follow Up In [facility] - PCP; Future    CBC and Auto Differential; Future    Comprehensive Metabolic Panel; Future    Hemoglobin A1C; Future    Lipid Panel; Future    Prostate Specific Antigen; Future    Thyroid Stimulating Hormone; Future    T4, free; Future    Cologuard® colon cancer screening; Future    Cologuard® colon cancer screening    Encounter for lipid screening for cardiovascular disease    Orders:    Lipid Panel; Future    Fatigue, unspecified type    Orders:    Thyroid Stimulating Hormone; Future    Fatigue associated with anemia    Orders:    T4, free; Future

## 2024-09-17 ENCOUNTER — LAB (OUTPATIENT)
Dept: LAB | Facility: LAB | Age: 69
End: 2024-09-17
Payer: MEDICARE

## 2024-09-17 DIAGNOSIS — Z12.11 COLON CANCER SCREENING: ICD-10-CM

## 2024-09-17 DIAGNOSIS — D64.9 FATIGUE ASSOCIATED WITH ANEMIA: ICD-10-CM

## 2024-09-17 DIAGNOSIS — R73.9 HYPERGLYCEMIA: ICD-10-CM

## 2024-09-17 DIAGNOSIS — J44.1 CHRONIC OBSTRUCTIVE PULMONARY DISEASE WITH ACUTE EXACERBATION (MULTI): ICD-10-CM

## 2024-09-17 DIAGNOSIS — R53.83 FATIGUE, UNSPECIFIED TYPE: ICD-10-CM

## 2024-09-17 DIAGNOSIS — Z12.5 PROSTATE CANCER SCREENING: ICD-10-CM

## 2024-09-17 DIAGNOSIS — Z00.00 MEDICARE ANNUAL WELLNESS VISIT, SUBSEQUENT: ICD-10-CM

## 2024-09-17 DIAGNOSIS — Z13.6 ENCOUNTER FOR LIPID SCREENING FOR CARDIOVASCULAR DISEASE: ICD-10-CM

## 2024-09-17 DIAGNOSIS — C34.11 MALIGNANT NEOPLASM OF UPPER LOBE OF RIGHT LUNG (MULTI): ICD-10-CM

## 2024-09-17 DIAGNOSIS — Z13.220 ENCOUNTER FOR LIPID SCREENING FOR CARDIOVASCULAR DISEASE: ICD-10-CM

## 2024-09-17 DIAGNOSIS — J44.9 CHRONIC OBSTRUCTIVE PULMONARY DISEASE, UNSPECIFIED COPD TYPE (MULTI): ICD-10-CM

## 2024-09-17 LAB
ALBUMIN SERPL BCP-MCNC: 4.2 G/DL (ref 3.4–5)
ALP SERPL-CCNC: 67 U/L (ref 33–136)
ALT SERPL W P-5'-P-CCNC: 27 U/L (ref 10–52)
ANION GAP SERPL CALC-SCNC: 15 MMOL/L (ref 10–20)
AST SERPL W P-5'-P-CCNC: 18 U/L (ref 9–39)
BASOPHILS # BLD AUTO: 0.06 X10*3/UL (ref 0–0.1)
BASOPHILS NFR BLD AUTO: 0.7 %
BILIRUB SERPL-MCNC: 0.6 MG/DL (ref 0–1.2)
BUN SERPL-MCNC: 15 MG/DL (ref 6–23)
CALCIUM SERPL-MCNC: 9.2 MG/DL (ref 8.6–10.6)
CHLORIDE SERPL-SCNC: 103 MMOL/L (ref 98–107)
CHOLEST SERPL-MCNC: 244 MG/DL (ref 0–199)
CHOLESTEROL/HDL RATIO: 4.6
CO2 SERPL-SCNC: 26 MMOL/L (ref 21–32)
CREAT SERPL-MCNC: 1.24 MG/DL (ref 0.5–1.3)
EGFRCR SERPLBLD CKD-EPI 2021: 63 ML/MIN/1.73M*2
EOSINOPHIL # BLD AUTO: 0.07 X10*3/UL (ref 0–0.7)
EOSINOPHIL NFR BLD AUTO: 0.9 %
ERYTHROCYTE [DISTWIDTH] IN BLOOD BY AUTOMATED COUNT: 13.7 % (ref 11.5–14.5)
GLUCOSE SERPL-MCNC: 96 MG/DL (ref 74–99)
HCT VFR BLD AUTO: 43.4 % (ref 41–52)
HDLC SERPL-MCNC: 52.6 MG/DL
HGB BLD-MCNC: 14 G/DL (ref 13.5–17.5)
IMM GRANULOCYTES # BLD AUTO: 0.04 X10*3/UL (ref 0–0.7)
IMM GRANULOCYTES NFR BLD AUTO: 0.5 % (ref 0–0.9)
LDLC SERPL CALC-MCNC: 137 MG/DL
LYMPHOCYTES # BLD AUTO: 1.65 X10*3/UL (ref 1.2–4.8)
LYMPHOCYTES NFR BLD AUTO: 20.6 %
MCH RBC QN AUTO: 30.9 PG (ref 26–34)
MCHC RBC AUTO-ENTMCNC: 32.3 G/DL (ref 32–36)
MCV RBC AUTO: 96 FL (ref 80–100)
MONOCYTES # BLD AUTO: 0.72 X10*3/UL (ref 0.1–1)
MONOCYTES NFR BLD AUTO: 9 %
NEUTROPHILS # BLD AUTO: 5.47 X10*3/UL (ref 1.2–7.7)
NEUTROPHILS NFR BLD AUTO: 68.3 %
NON HDL CHOLESTEROL: 191 MG/DL (ref 0–149)
NRBC BLD-RTO: 0 /100 WBCS (ref 0–0)
PLATELET # BLD AUTO: 188 X10*3/UL (ref 150–450)
POTASSIUM SERPL-SCNC: 3.9 MMOL/L (ref 3.5–5.3)
PROT SERPL-MCNC: 6.6 G/DL (ref 6.4–8.2)
PSA SERPL-MCNC: 3.04 NG/ML
RBC # BLD AUTO: 4.53 X10*6/UL (ref 4.5–5.9)
SODIUM SERPL-SCNC: 140 MMOL/L (ref 136–145)
T4 FREE SERPL-MCNC: 1.18 NG/DL (ref 0.78–1.48)
TRIGL SERPL-MCNC: 274 MG/DL (ref 0–149)
TSH SERPL-ACNC: 1.41 MIU/L (ref 0.44–3.98)
VLDL: 55 MG/DL (ref 0–40)
WBC # BLD AUTO: 8 X10*3/UL (ref 4.4–11.3)

## 2024-09-17 PROCEDURE — G0103 PSA SCREENING: HCPCS

## 2024-09-17 PROCEDURE — 84439 ASSAY OF FREE THYROXINE: CPT

## 2024-09-17 PROCEDURE — 80053 COMPREHEN METABOLIC PANEL: CPT

## 2024-09-17 PROCEDURE — 85025 COMPLETE CBC W/AUTO DIFF WBC: CPT

## 2024-09-17 PROCEDURE — 84443 ASSAY THYROID STIM HORMONE: CPT

## 2024-09-17 PROCEDURE — 80061 LIPID PANEL: CPT

## 2024-09-17 PROCEDURE — 83036 HEMOGLOBIN GLYCOSYLATED A1C: CPT

## 2024-09-17 PROCEDURE — 36415 COLL VENOUS BLD VENIPUNCTURE: CPT

## 2024-09-18 LAB
EST. AVERAGE GLUCOSE BLD GHB EST-MCNC: 117 MG/DL
HBA1C MFR BLD: 5.7 %

## 2024-09-20 ENCOUNTER — TELEPHONE (OUTPATIENT)
Dept: PRIMARY CARE | Facility: CLINIC | Age: 69
End: 2024-09-20
Payer: MEDICARE

## 2024-09-24 RX ORDER — ROFLUMILAST 250 UG/1
250 TABLET ORAL
COMMUNITY
Start: 2024-09-17

## 2024-09-24 RX ORDER — AZITHROMYCIN 250 MG/1
250 TABLET, FILM COATED ORAL EVERY OTHER DAY
COMMUNITY
Start: 2024-09-17

## 2024-09-27 ENCOUNTER — TELEPHONE (OUTPATIENT)
Dept: PRIMARY CARE | Facility: CLINIC | Age: 69
End: 2024-09-27
Payer: MEDICARE

## 2024-09-27 LAB — NONINV COLON CA DNA+OCC BLD SCRN STL QL: NEGATIVE

## 2024-09-27 NOTE — TELEPHONE ENCOUNTER
----- Message from Germain Pérez sent at 9/27/2024 11:16 AM EDT -----  Call Norma Erwin Their labs were normal  Call pt their cologuard colon cancer screening test was negative and it should be repeated in 3 years.

## 2024-11-18 ENCOUNTER — TELEPHONE (OUTPATIENT)
Dept: PRIMARY CARE | Facility: CLINIC | Age: 69
End: 2024-11-18
Payer: MEDICARE

## 2024-11-18 DIAGNOSIS — U07.1 COVID-19: Primary | ICD-10-CM

## 2024-11-18 DIAGNOSIS — U07.1 COVID-19: ICD-10-CM

## 2024-11-18 RX ORDER — CODEINE PHOSPHATE AND GUAIFENESIN 10; 100 MG/5ML; MG/5ML
10 SOLUTION ORAL EVERY 6 HOURS PRN
Qty: 200 ML | Refills: 0 | Status: SHIPPED | OUTPATIENT
Start: 2024-11-18 | End: 2024-11-18 | Stop reason: SDUPTHER

## 2024-11-18 RX ORDER — PREDNISONE 20 MG/1
40 TABLET ORAL DAILY
Qty: 10 TABLET | Refills: 0 | Status: SHIPPED | OUTPATIENT
Start: 2024-11-18 | End: 2024-11-23

## 2024-11-18 RX ORDER — CODEINE PHOSPHATE AND GUAIFENESIN 10; 100 MG/5ML; MG/5ML
10 SOLUTION ORAL EVERY 6 HOURS PRN
Qty: 200 ML | Refills: 0 | Status: SHIPPED | OUTPATIENT
Start: 2024-11-18 | End: 2024-11-25

## 2024-11-18 NOTE — TELEPHONE ENCOUNTER
Pharmacy called and needs the Guainfensin RX changed and resent.. State board of pharmacy has them limit the initial fill for a controlled to no more than 7 days. Can this please be adjusted and resent?

## 2024-11-18 NOTE — TELEPHONE ENCOUNTER
Pt tested Positive For Covid. Can you call in anti viral and call the patient.    [facility] in [place].

## 2024-12-16 NOTE — PROGRESS NOTES
"Fred Erwin  is a 69 y.o. male who presents for evaluation of right upper lobe lung cancer status postsurgical resection 2/27/2023.     This patient presented with a an abnormal x-ray in November 2022. He was referred for a CT scan which confirmed a right upper lobe lung lesion. He was referred for a CT-guided biopsy which was performed in January 2023 and showed poorly differentiated carcinoma. He was referred for a PET/CT which showed a right upper lobe lesion with no evidence of metastatic disease. I recommended surgical resection. The patient was taken to the operating room on 2/27/2023 and underwent a stable right upper lobe segmental resection. He did well and was discharged home.     Currently the patient is {Usual state of health:05292}. He {T report deny:94618}. {WILDorBLANK:86620::\" \"}    {CWT [facility] stuff:19589}    He  reports that he has quit smoking. His smoking use included cigarettes. He has never used smokeless tobacco.    Objective   Physical Exam  {T exam:01098}  Diagnostic Studies  {CWT reviewed:76194}    Assessment/Plan   I believe that the patient {CWT doing well:06034}.     {CWT plan smart text:47093}    I recommend {CWTworkup:34108}    I discussed this in detail with the patient, including a discussion of alternatives. They were comfortable with this approach.     Michael Auguste MD  [phone]    "

## 2024-12-19 ENCOUNTER — APPOINTMENT (OUTPATIENT)
Dept: SURGERY | Facility: HOSPITAL | Age: 69
End: 2024-12-19
Payer: MEDICARE

## 2025-01-13 ENCOUNTER — HOSPITAL ENCOUNTER (OUTPATIENT)
Dept: RADIOLOGY | Facility: CLINIC | Age: 70
Discharge: HOME | End: 2025-01-13
Payer: MEDICARE

## 2025-01-13 DIAGNOSIS — J44.9 CHRONIC OBSTRUCTIVE PULMONARY DISEASE, UNSPECIFIED: ICD-10-CM

## 2025-01-13 PROCEDURE — 71250 CT THORAX DX C-: CPT | Performed by: RADIOLOGY

## 2025-01-13 PROCEDURE — 71250 CT THORAX DX C-: CPT

## 2025-01-14 NOTE — PROGRESS NOTES
Fred Erwin  is a 69 y.o. male who presents for evaluation of right upper lobe lung cancer status postsurgical resection 2/27/2023.     This patient presented with a an abnormal x-ray in November 2022. He was referred for a CT scan which confirmed a right upper lobe lung lesion. He was referred for a CT-guided biopsy which was performed in January 2023 and showed poorly differentiated carcinoma. He was referred for a PET/CT which showed a right upper lobe lesion with no evidence of metastatic disease. I recommended surgical resection. The patient was taken to the operating room on 2/27/2023 and underwent a stable right upper lobe segmental resection. He did well and was discharged home.     Currently the patient is presenting for routine follow-up and in their usual state of health. He denies the following symptoms: chest pain, shortness of breath at rest, hemoptysis, fevers, chills, weight loss, and wound complications.      There have been no significant changes to their documented medical, surgical and family history.     He  reports that he has quit smoking. His smoking use included cigarettes. He has never used smokeless tobacco.    Objective   Physical Exam  Phone visit (audio only evaluation)   Diagnostic Studies  === 01/13/25 ===    CT CHEST WO IV CONTRAST    - Impression -  1. Interval resolution of consolidation in the left upper lobe with  linear appearing residual scar demonstrated    2. Scar demonstrated in the right lung as described above stable.      MACRO:  None    Signed by: Lukas Berrios 1/13/2025 10:36 AM  Dictation workstation:   [id number]    Assessment/Plan   I believe that the patient has no evidence of cancer recurrence.     Based on the patient's clinical presentation and my review of their radiographic imaging, I believe they have no evidence of cancer recurrence.  I recommend ongoing radiographic screening.    I recommend CT chest as per oncology    I discussed this in  detail with the patient, including a discussion of alternatives. They were comfortable with this approach.     Michael Auguste MD  [phone]  Time 10m

## 2025-01-16 ENCOUNTER — TELEMEDICINE (OUTPATIENT)
Dept: SURGERY | Facility: HOSPITAL | Age: 70
End: 2025-01-16
Payer: MEDICARE

## 2025-01-16 DIAGNOSIS — C34.11 MALIGNANT NEOPLASM OF UPPER LOBE OF RIGHT LUNG (MULTI): Primary | ICD-10-CM

## 2025-02-10 DIAGNOSIS — C34.11 MALIGNANT NEOPLASM OF UPPER LOBE OF RIGHT LUNG (MULTI): ICD-10-CM

## 2025-03-10 ENCOUNTER — APPOINTMENT (OUTPATIENT)
Dept: PRIMARY CARE | Facility: CLINIC | Age: 70
End: 2025-03-10
Payer: MEDICARE

## 2025-03-17 ENCOUNTER — APPOINTMENT (OUTPATIENT)
Dept: PRIMARY CARE | Facility: CLINIC | Age: 70
End: 2025-03-17
Payer: MEDICARE

## 2025-03-17 ENCOUNTER — HOSPITAL ENCOUNTER (OUTPATIENT)
Dept: RADIOLOGY | Facility: CLINIC | Age: 70
Discharge: HOME | End: 2025-03-17
Payer: MEDICARE

## 2025-03-17 VITALS
HEART RATE: 97 BPM | TEMPERATURE: 96.8 F | BODY MASS INDEX: 31.94 KG/M2 | WEIGHT: 229 LBS | OXYGEN SATURATION: 97 % | DIASTOLIC BLOOD PRESSURE: 82 MMHG | SYSTOLIC BLOOD PRESSURE: 130 MMHG

## 2025-03-17 DIAGNOSIS — M79.642 PAIN IN BOTH HANDS: ICD-10-CM

## 2025-03-17 DIAGNOSIS — J44.1 CHRONIC OBSTRUCTIVE PULMONARY DISEASE WITH ACUTE EXACERBATION (MULTI): Primary | ICD-10-CM

## 2025-03-17 DIAGNOSIS — M79.641 PAIN IN BOTH HANDS: ICD-10-CM

## 2025-03-17 DIAGNOSIS — J44.9 CHRONIC OBSTRUCTIVE PULMONARY DISEASE, UNSPECIFIED COPD TYPE (MULTI): ICD-10-CM

## 2025-03-17 DIAGNOSIS — C34.11 MALIGNANT NEOPLASM OF UPPER LOBE OF RIGHT LUNG (MULTI): ICD-10-CM

## 2025-03-17 DIAGNOSIS — J44.1 CHRONIC OBSTRUCTIVE PULMONARY DISEASE WITH ACUTE EXACERBATION (MULTI): ICD-10-CM

## 2025-03-17 DIAGNOSIS — E78.5 HYPERLIPIDEMIA, UNSPECIFIED HYPERLIPIDEMIA TYPE: ICD-10-CM

## 2025-03-17 DIAGNOSIS — R73.9 HYPERGLYCEMIA: ICD-10-CM

## 2025-03-17 PROCEDURE — 73120 X-RAY EXAM OF HAND: CPT | Mod: 50

## 2025-03-17 PROCEDURE — 99213 OFFICE O/P EST LOW 20 MIN: CPT | Performed by: FAMILY MEDICINE

## 2025-03-17 PROCEDURE — 73120 X-RAY EXAM OF HAND: CPT | Mod: BILATERAL PROCEDURE | Performed by: RADIOLOGY

## 2025-03-17 RX ORDER — FLUTICASONE FUROATE, UMECLIDINIUM BROMIDE AND VILANTEROL TRIFENATATE 200; 62.5; 25 UG/1; UG/1; UG/1
1 POWDER RESPIRATORY (INHALATION) DAILY
COMMUNITY
Start: 2025-03-11

## 2025-03-17 RX ORDER — CELECOXIB 200 MG/1
200 CAPSULE ORAL 2 TIMES DAILY
Qty: 60 CAPSULE | Refills: 5 | Status: SHIPPED | OUTPATIENT
Start: 2025-03-17 | End: 2025-09-13

## 2025-03-17 ASSESSMENT — PATIENT HEALTH QUESTIONNAIRE - PHQ9
1. LITTLE INTEREST OR PLEASURE IN DOING THINGS: NOT AT ALL
SUM OF ALL RESPONSES TO PHQ9 QUESTIONS 1 AND 2: 0
2. FEELING DOWN, DEPRESSED OR HOPELESS: NOT AT ALL

## 2025-03-17 ASSESSMENT — ENCOUNTER SYMPTOMS
SHORTNESS OF BREATH: 1
POLYPHAGIA: 0
APPETITE CHANGE: 0
MYALGIAS: 1
NAUSEA: 0
COUGH: 1
NUMBNESS: 0
FATIGUE: 0
POLYDIPSIA: 0
WEAKNESS: 0
DIZZINESS: 0
VOMITING: 0
CHEST TIGHTNESS: 0
HEADACHES: 0
DIARRHEA: 0
FREQUENCY: 0
ARTHRALGIAS: 1

## 2025-03-17 NOTE — PROGRESS NOTES
Subjective   Patient ID: Matt Erwin is a 69 y.o. male who presents for Med Refill (6 month med check . He starting to get a lot of joint pain since starting the new medications.).    HPI     Review of Systems   Constitutional:  Negative for appetite change and fatigue.   Eyes:  Negative for visual disturbance.   Respiratory:  Positive for cough and shortness of breath. Negative for chest tightness.    Cardiovascular:  Negative for chest pain and leg swelling.   Gastrointestinal:  Negative for diarrhea, nausea and vomiting.   Endocrine: Negative for polydipsia, polyphagia and polyuria.   Genitourinary:  Negative for frequency and urgency.   Musculoskeletal:  Positive for arthralgias and myalgias.   Neurological:  Negative for dizziness, syncope, weakness, numbness and headaches.       Objective   /82   Pulse 97   Temp 36 °C (96.8 °F)   Wt 104 kg (229 lb)   SpO2 97%   BMI 31.94 kg/m²     Physical Exam  Constitutional:       Appearance: Normal appearance.   HENT:      Head: Normocephalic.   Eyes:      Conjunctiva/sclera: Conjunctivae normal.   Cardiovascular:      Rate and Rhythm: Normal rate and regular rhythm.      Heart sounds: Normal heart sounds.   Pulmonary:      Effort: Pulmonary effort is normal.      Breath sounds: Rhonchi present.   Musculoskeletal:      Right hand: Tenderness present. Decreased range of motion.      Left hand: Tenderness present. Decreased range of motion.      Cervical back: Neck supple.   Skin:     General: Skin is warm and dry.   Neurological:      Mental Status: He is alert.         Assessment/Plan   Problem List Items Addressed This Visit             ICD-10-CM    Lung cancer (Multi) C34.90    Relevant Medications    celecoxib (CeleBREX) 200 mg capsule    Other Relevant Orders    Lipid Panel    Comprehensive Metabolic Panel    Hemoglobin A1C    XR hand left 1-2 views     Other Visit Diagnoses         Codes    Chronic obstructive pulmonary disease with acute exacerbation  (Multi)    -  Primary J44.1    Relevant Medications    celecoxib (CeleBREX) 200 mg capsule    Other Relevant Orders    Lipid Panel    Comprehensive Metabolic Panel    Hemoglobin A1C    XR hand left 1-2 views    Chronic obstructive pulmonary disease, unspecified COPD type (Multi)     J44.9    Relevant Medications    celecoxib (CeleBREX) 200 mg capsule    Other Relevant Orders    Lipid Panel    Comprehensive Metabolic Panel    Hemoglobin A1C    XR hand left 1-2 views    Hyperglycemia     R73.9    Relevant Medications    celecoxib (CeleBREX) 200 mg capsule    Other Relevant Orders    Lipid Panel    Comprehensive Metabolic Panel    Hemoglobin A1C    XR hand left 1-2 views    Hyperlipidemia, unspecified hyperlipidemia type     E78.5    Relevant Medications    celecoxib (CeleBREX) 200 mg capsule    Other Relevant Orders    Lipid Panel    Comprehensive Metabolic Panel    Hemoglobin A1C    XR hand left 1-2 views    Pain in both hands     M79.641, M79.642    Relevant Orders    XR hand left 1-2 views

## 2025-03-18 LAB
ALBUMIN SERPL-MCNC: 4.6 G/DL (ref 3.6–5.1)
ALP SERPL-CCNC: 71 U/L (ref 35–144)
ALT SERPL-CCNC: 23 U/L (ref 9–46)
ANION GAP SERPL CALCULATED.4IONS-SCNC: 11 MMOL/L (CALC) (ref 7–17)
AST SERPL-CCNC: 17 U/L (ref 10–35)
BILIRUB SERPL-MCNC: 0.5 MG/DL (ref 0.2–1.2)
BUN SERPL-MCNC: 17 MG/DL (ref 7–25)
CALCIUM SERPL-MCNC: 9.6 MG/DL (ref 8.6–10.3)
CHLORIDE SERPL-SCNC: 104 MMOL/L (ref 98–110)
CHOLEST SERPL-MCNC: 245 MG/DL
CHOLEST/HDLC SERPL: 3.7 (CALC)
CO2 SERPL-SCNC: 24 MMOL/L (ref 20–32)
CREAT SERPL-MCNC: 1.06 MG/DL (ref 0.7–1.28)
EGFRCR SERPLBLD CKD-EPI 2021: 75 ML/MIN/1.73M2
EST. AVERAGE GLUCOSE BLD GHB EST-MCNC: 114 MG/DL
EST. AVERAGE GLUCOSE BLD GHB EST-SCNC: 6.3 MMOL/L
GLUCOSE SERPL-MCNC: 87 MG/DL (ref 65–99)
HBA1C MFR BLD: 5.6 % OF TOTAL HGB
HDLC SERPL-MCNC: 67 MG/DL
LDLC SERPL CALC-MCNC: 133 MG/DL (CALC)
NONHDLC SERPL-MCNC: 178 MG/DL (CALC)
POTASSIUM SERPL-SCNC: 4 MMOL/L (ref 3.5–5.3)
PROT SERPL-MCNC: 7 G/DL (ref 6.1–8.1)
SODIUM SERPL-SCNC: 139 MMOL/L (ref 135–146)
TRIGL SERPL-MCNC: 313 MG/DL

## 2025-03-19 ENCOUNTER — TELEPHONE (OUTPATIENT)
Dept: PRIMARY CARE | Facility: CLINIC | Age: 70
End: 2025-03-19
Payer: MEDICARE

## 2025-03-19 NOTE — TELEPHONE ENCOUNTER
----- Message from Germain Pérez sent at 3/18/2025  3:54 PM EDT -----  Call Norma Erwin  there lab results were abnormal.   Call pt his chol was too high would he consider a low dose of cholesterol medication

## 2025-04-01 ENCOUNTER — TELEPHONE (OUTPATIENT)
Dept: PRIMARY CARE | Facility: CLINIC | Age: 70
End: 2025-04-01
Payer: MEDICARE

## 2025-04-01 NOTE — TELEPHONE ENCOUNTER
----- Message from Germain Pérez sent at 4/1/2025  8:37 AM EDT -----  See message  ----- Message -----  From: Germain Pérez DO  Sent: 3/31/2025  12:00 AM EDT  To: Germain Pérez, DO     How is joint pain with celebrex?

## 2025-07-01 ENCOUNTER — TELEPHONE (OUTPATIENT)
Dept: PRIMARY CARE | Facility: CLINIC | Age: 70
End: 2025-07-01
Payer: MEDICARE

## 2025-07-02 DIAGNOSIS — E78.5 HYPERLIPIDEMIA, UNSPECIFIED HYPERLIPIDEMIA TYPE: Primary | ICD-10-CM

## 2025-07-14 ENCOUNTER — HOSPITAL ENCOUNTER (OUTPATIENT)
Dept: RADIOLOGY | Facility: HOSPITAL | Age: 70
Discharge: HOME | End: 2025-07-14
Payer: MEDICARE

## 2025-07-14 DIAGNOSIS — C34.11 MALIGNANT NEOPLASM OF UPPER LOBE OF RIGHT LUNG (MULTI): ICD-10-CM

## 2025-07-14 PROCEDURE — 71250 CT THORAX DX C-: CPT

## 2025-07-14 PROCEDURE — 71250 CT THORAX DX C-: CPT | Performed by: RADIOLOGY

## 2025-07-15 NOTE — PROGRESS NOTES
Subjective   Matt Erwin  is a 69 y.o. male who presents for evaluation of right upper lobe lung cancer status postsurgical resection 2/27/2023.     This patient presented with a an abnormal x-ray in November 2022. He was referred for a CT scan which confirmed a right upper lobe lung lesion. He was referred for a CT-guided biopsy which was performed in January 2023 and showed poorly differentiated carcinoma. He was referred for a PET/CT which showed a right upper lobe lesion with no evidence of metastatic disease. I recommended surgical resection. The patient was taken to the operating room on 2/27/2023 and underwent a stable right upper lobe segmental resection. He did well and was discharged home.    Currently the patient is presenting for routine follow-up and in their usual state of health. He denies the following symptoms: chest pain, shortness of breath at rest, shortness of breath with activity, cough, hemoptysis, fevers, chills, and weight loss.      There have been no significant changes to their documented medical, surgical and family history.     He  reports that he has quit smoking. His smoking use included cigarettes. He has never used smokeless tobacco. He reports that he does not use drugs.    Objective   Physical Exam  Phone visit (audio only evaluation - patient declined request to have a video visit)   Diagnostic Studies  === 07/14/25 ===    CT CHEST WO IV CONTRAST    - Impression -  Restaging scan poorly differentiated lung adenocarcinoma as compared  to 01/13/2025 CT chest:    1. New solid noncalcified 3 mm pulmonary nodule within the posterior  right lower lobe is likely infectious/inflammatory. However,  recommend short-term interval follow-up in 3-6 months.  2. Stable indeterminate right renal lesion.  3. Similar hepatic steatosis.    I personally reviewed the images/study and I agree with the findings  as stated by Karine Dykes MD (PGY-3). This study was interpreted at  [facility]  [place], [place].    MACRO:  Critical Finding:  See findings. Notification was initiated on  7/14/2025 at 6:31 pm by  Karine Dykes.  (**-YCF-**) Instructions:  See Findings.    Signed by: Donna Nesbitt 7/14/2025 10:19 PM  Dictation workstation:   [id number]    Assessment/Plan   I believe that the patient is doing well.     Based on the patient's clinical presentation and my review of their radiographic imaging, I believe they have no evidence of cancer recurrence.  I recommend ongoing radiographic screening.    Based on the patient's clinical presentation and available radiographic imaging, I believe the nodule is likely benign, but I cannot rule out the possibility of malignancy.  We discussed various management strategies including surgery, biopsy, and observation.  A 3 mm nodule is too small to warrant surgery and is too small for accurate biopsy.  I believe radiographic surveillance is the only viable option    I recommend CT chest in 6 months    I discussed this in detail with the patient, including a discussion of alternatives. They were comfortable with this approach.     Michael Auguste MD  [phone]  They declined video call, consent obtained, Time: 10  min.

## 2025-07-17 ENCOUNTER — TELEMEDICINE (OUTPATIENT)
Dept: SURGERY | Facility: HOSPITAL | Age: 70
End: 2025-07-17
Payer: MEDICARE

## 2025-07-17 DIAGNOSIS — C34.11 MALIGNANT NEOPLASM OF UPPER LOBE OF RIGHT LUNG (MULTI): Primary | ICD-10-CM

## 2025-07-17 DIAGNOSIS — C34.11 MALIGNANT NEOPLASM OF UPPER LOBE OF RIGHT LUNG (MULTI): ICD-10-CM

## 2025-07-17 PROCEDURE — 99212 OFFICE O/P EST SF 10 MIN: CPT | Performed by: THORACIC SURGERY (CARDIOTHORACIC VASCULAR SURGERY)

## 2025-07-17 PROCEDURE — 1123F ACP DISCUSS/DSCN MKR DOCD: CPT | Performed by: THORACIC SURGERY (CARDIOTHORACIC VASCULAR SURGERY)

## 2025-09-09 ENCOUNTER — APPOINTMENT (OUTPATIENT)
Dept: PRIMARY CARE | Facility: CLINIC | Age: 70
End: 2025-09-09
Payer: MEDICARE